# Patient Record
Sex: MALE | Employment: FULL TIME | URBAN - METROPOLITAN AREA
[De-identification: names, ages, dates, MRNs, and addresses within clinical notes are randomized per-mention and may not be internally consistent; named-entity substitution may affect disease eponyms.]

---

## 2017-01-03 ENCOUNTER — MED REC SCAN ONLY (OUTPATIENT)
Dept: FAMILY MEDICINE CLINIC | Facility: CLINIC | Age: 32
End: 2017-01-03

## 2017-01-03 ENCOUNTER — TELEPHONE (OUTPATIENT)
Dept: FAMILY MEDICINE CLINIC | Facility: CLINIC | Age: 32
End: 2017-01-03

## 2017-01-03 NOTE — TELEPHONE ENCOUNTER
Spoke with the pt and advised that I do not know if the pharmacy will fill it on 1/5/17 or if they can fill it and then transfer it down to [place].  I advised him to call the pharmacy to see what they recommend- he v/u

## 2017-02-02 ENCOUNTER — TELEPHONE (OUTPATIENT)
Dept: FAMILY MEDICINE CLINIC | Facility: CLINIC | Age: 32
End: 2017-02-02

## 2017-02-02 RX ORDER — MOMETASONE FUROATE 1 MG/G
CREAM TOPICAL
Qty: 60 G | Refills: 1 | Status: SHIPPED | OUTPATIENT
Start: 2017-02-02 | End: 2017-06-16

## 2017-02-02 NOTE — TELEPHONE ENCOUNTER
From: Ember Mckee  To: Nedra Genao MD  Sent: 2/2/2017 11:51 AM CST  Subject: Medication Renewal Request    Original authorizing provider: MD Ember Solorzano would like a refill of the following medications:  Amphetamine-Dextroamphet

## 2017-02-02 NOTE — TELEPHONE ENCOUNTER
From: Ebonie Downs  To:  Ilir Monzon DO  Sent: 2/2/2017 11:51 AM CST  Subject: Medication Renewal Request    Original authorizing provider: DO Ebonie Rankin would like a refill of the following medications:  Mometasone Furoate [facility]

## 2017-02-03 RX ORDER — DEXTROAMPHETAMINE SACCHARATE, AMPHETAMINE ASPARTATE MONOHYDRATE, DEXTROAMPHETAMINE SULFATE AND AMPHETAMINE SULFATE 7.5; 7.5; 7.5; 7.5 MG/1; MG/1; MG/1; MG/1
30 CAPSULE, EXTENDED RELEASE ORAL DAILY
Qty: 30 CAPSULE | Refills: 0 | Status: SHIPPED | OUTPATIENT
Start: 2017-02-03 | End: 2017-03-01

## 2017-02-03 RX ORDER — DEXTROAMPHETAMINE SACCHARATE, AMPHETAMINE ASPARTATE, DEXTROAMPHETAMINE SULFATE AND AMPHETAMINE SULFATE 7.5; 7.5; 7.5; 7.5 MG/1; MG/1; MG/1; MG/1
TABLET ORAL
Qty: 30 TABLET | Refills: 0 | Status: SHIPPED | OUTPATIENT
Start: 2017-02-03 | End: 2017-03-01

## 2017-03-01 RX ORDER — DEXTROAMPHETAMINE SACCHARATE, AMPHETAMINE ASPARTATE, DEXTROAMPHETAMINE SULFATE AND AMPHETAMINE SULFATE 7.5; 7.5; 7.5; 7.5 MG/1; MG/1; MG/1; MG/1
TABLET ORAL
Qty: 30 TABLET | Refills: 0 | Status: SHIPPED | OUTPATIENT
Start: 2017-03-01 | End: 2017-03-31

## 2017-03-01 RX ORDER — PANTOPRAZOLE SODIUM 40 MG/1
40 TABLET, DELAYED RELEASE ORAL DAILY
Qty: 30 TABLET | Refills: 1 | Status: SHIPPED | OUTPATIENT
Start: 2017-03-01 | End: 2017-09-11

## 2017-03-01 RX ORDER — DEXTROAMPHETAMINE SACCHARATE, AMPHETAMINE ASPARTATE MONOHYDRATE, DEXTROAMPHETAMINE SULFATE AND AMPHETAMINE SULFATE 7.5; 7.5; 7.5; 7.5 MG/1; MG/1; MG/1; MG/1
30 CAPSULE, EXTENDED RELEASE ORAL DAILY
Qty: 30 CAPSULE | Refills: 0 | Status: SHIPPED | OUTPATIENT
Start: 2017-03-01 | End: 2017-03-31

## 2017-03-01 NOTE — TELEPHONE ENCOUNTER
Last refill on both Adderall #30 with 0 refills on 2 3 2017  Last refill on Protonix #30 with 1 refill on 5 16 2016

## 2017-03-01 NOTE — TELEPHONE ENCOUNTER
From: Yareli Butler  To: Dillon Swanson MD  Sent: 3/1/2017 2:20 PM CST  Subject: Medication Renewal Request    Original authorizing provider: MD Yareli Siu would like a refill of the following medications:  Pantoprazole Sodium (PROT

## 2017-03-31 RX ORDER — DEXTROAMPHETAMINE SACCHARATE, AMPHETAMINE ASPARTATE MONOHYDRATE, DEXTROAMPHETAMINE SULFATE AND AMPHETAMINE SULFATE 7.5; 7.5; 7.5; 7.5 MG/1; MG/1; MG/1; MG/1
30 CAPSULE, EXTENDED RELEASE ORAL DAILY
Qty: 30 CAPSULE | Refills: 0 | Status: SHIPPED | OUTPATIENT
Start: 2017-03-31 | End: 2017-04-26

## 2017-03-31 RX ORDER — DEXTROAMPHETAMINE SACCHARATE, AMPHETAMINE ASPARTATE MONOHYDRATE, DEXTROAMPHETAMINE SULFATE AND AMPHETAMINE SULFATE 7.5; 7.5; 7.5; 7.5 MG/1; MG/1; MG/1; MG/1
30 CAPSULE, EXTENDED RELEASE ORAL EVERY MORNING
Qty: 30 CAPSULE | Refills: 0 | Status: CANCELLED | OUTPATIENT
Start: 2017-03-31

## 2017-03-31 RX ORDER — DEXTROAMPHETAMINE SACCHARATE, AMPHETAMINE ASPARTATE, DEXTROAMPHETAMINE SULFATE AND AMPHETAMINE SULFATE 7.5; 7.5; 7.5; 7.5 MG/1; MG/1; MG/1; MG/1
TABLET ORAL
Qty: 30 TABLET | Refills: 0 | Status: SHIPPED | OUTPATIENT
Start: 2017-03-31 | End: 2017-04-26

## 2017-03-31 NOTE — TELEPHONE ENCOUNTER
From: Joshua Arreaga  To: Chico Avila MD  Sent: 3/31/2017 8:57 AM CDT  Subject: Medication Renewal Request    Original authorizing provider: MD Joshua Lindsey would like a refill of the following medications:  Amphetamine-Dextroamphet

## 2017-04-26 RX ORDER — DEXTROAMPHETAMINE SACCHARATE, AMPHETAMINE ASPARTATE MONOHYDRATE, DEXTROAMPHETAMINE SULFATE AND AMPHETAMINE SULFATE 7.5; 7.5; 7.5; 7.5 MG/1; MG/1; MG/1; MG/1
30 CAPSULE, EXTENDED RELEASE ORAL DAILY
Qty: 30 CAPSULE | Refills: 0 | Status: SHIPPED | OUTPATIENT
Start: 2017-04-26 | End: 2017-05-23

## 2017-04-26 RX ORDER — DEXTROAMPHETAMINE SACCHARATE, AMPHETAMINE ASPARTATE, DEXTROAMPHETAMINE SULFATE AND AMPHETAMINE SULFATE 7.5; 7.5; 7.5; 7.5 MG/1; MG/1; MG/1; MG/1
TABLET ORAL
Qty: 30 TABLET | Refills: 0 | Status: SHIPPED | OUTPATIENT
Start: 2017-04-26 | End: 2017-05-23

## 2017-05-23 RX ORDER — DEXTROAMPHETAMINE SACCHARATE, AMPHETAMINE ASPARTATE MONOHYDRATE, DEXTROAMPHETAMINE SULFATE AND AMPHETAMINE SULFATE 7.5; 7.5; 7.5; 7.5 MG/1; MG/1; MG/1; MG/1
30 CAPSULE, EXTENDED RELEASE ORAL DAILY
Qty: 30 CAPSULE | Refills: 0 | Status: SHIPPED | OUTPATIENT
Start: 2017-05-23 | End: 2017-06-16

## 2017-05-23 RX ORDER — DEXTROAMPHETAMINE SACCHARATE, AMPHETAMINE ASPARTATE, DEXTROAMPHETAMINE SULFATE AND AMPHETAMINE SULFATE 7.5; 7.5; 7.5; 7.5 MG/1; MG/1; MG/1; MG/1
TABLET ORAL
Qty: 30 TABLET | Refills: 0 | Status: SHIPPED | OUTPATIENT
Start: 2017-05-23 | End: 2017-06-16

## 2017-05-23 NOTE — TELEPHONE ENCOUNTER
From: Florinda Perkins  To: Theresa Hall MD  Sent: 5/23/2017 1:56 PM CDT  Subject: Medication Renewal Request    Original authorizing provider: MD Florinda Morfin would like a refill of the following medications:  Amphetamine-Dextroamphet

## 2017-06-16 RX ORDER — DEXTROAMPHETAMINE SACCHARATE, AMPHETAMINE ASPARTATE MONOHYDRATE, DEXTROAMPHETAMINE SULFATE AND AMPHETAMINE SULFATE 7.5; 7.5; 7.5; 7.5 MG/1; MG/1; MG/1; MG/1
30 CAPSULE, EXTENDED RELEASE ORAL DAILY
Qty: 30 CAPSULE | Refills: 0 | Status: SHIPPED | OUTPATIENT
Start: 2017-06-16 | End: 2017-07-16

## 2017-06-16 RX ORDER — MOMETASONE FUROATE 1 MG/G
CREAM TOPICAL
Qty: 60 G | Refills: 1 | Status: SHIPPED | OUTPATIENT
Start: 2017-06-16 | End: 2017-11-06

## 2017-06-16 RX ORDER — DEXTROAMPHETAMINE SACCHARATE, AMPHETAMINE ASPARTATE, DEXTROAMPHETAMINE SULFATE AND AMPHETAMINE SULFATE 7.5; 7.5; 7.5; 7.5 MG/1; MG/1; MG/1; MG/1
TABLET ORAL
Qty: 30 TABLET | Refills: 0 | Status: SHIPPED | OUTPATIENT
Start: 2017-06-16 | End: 2017-07-17

## 2017-06-16 NOTE — TELEPHONE ENCOUNTER
From: Elizabeth Nava  To: Keane Rubinstein, MD  Sent: 6/16/2017 12:26 PM CDT  Subject: Medication Renewal Request    Original authorizing provider: Gussie Holstein, MD Sharyon Corners would like a refill of the following medications:  Mometasone Furoate 0.1

## 2017-07-17 RX ORDER — DEXTROAMPHETAMINE SACCHARATE, AMPHETAMINE ASPARTATE, DEXTROAMPHETAMINE SULFATE AND AMPHETAMINE SULFATE 7.5; 7.5; 7.5; 7.5 MG/1; MG/1; MG/1; MG/1
TABLET ORAL
Qty: 30 TABLET | Refills: 0 | Status: SHIPPED | OUTPATIENT
Start: 2017-07-17 | End: 2017-08-11

## 2017-07-17 RX ORDER — DEXTROAMPHETAMINE SACCHARATE, AMPHETAMINE ASPARTATE MONOHYDRATE, DEXTROAMPHETAMINE SULFATE AND AMPHETAMINE SULFATE 7.5; 7.5; 7.5; 7.5 MG/1; MG/1; MG/1; MG/1
30 CAPSULE, EXTENDED RELEASE ORAL EVERY MORNING
Qty: 30 CAPSULE | Refills: 0 | Status: SHIPPED | OUTPATIENT
Start: 2017-07-17 | End: 2017-08-11

## 2017-07-17 NOTE — TELEPHONE ENCOUNTER
Patient picked up 2 scripts for Adderall ( 1 for Adderall XR 30 MG & 1 for Adderall 30 MG) on 07/17/17. [place] ID #[id number].

## 2017-07-17 NOTE — TELEPHONE ENCOUNTER
From: Martha Castillo  Sent: 7/17/2017 9:37 AM CDT  Subject: Medication Renewal Request    Martha Castillo would like a refill of the following medications:  Amphetamine-Dextroamphet ER (ADDERALL XR) 30 MG Oral Capsule SR 24 Hr Mayela Lopez MD]    Prefe

## 2017-08-11 NOTE — TELEPHONE ENCOUNTER
From: Concepcion Mathew  Sent: 8/11/2017 12:20 PM CDT  Subject: Medication Renewal Request    Concepcion Mathew would like a refill of the following medications:  Fluticasone Propionate 50 MCG/ACT Nasal Suspension Crystal Soto MD]  Amphetamine-Dextroamphet

## 2017-08-14 RX ORDER — DEXTROAMPHETAMINE SACCHARATE, AMPHETAMINE ASPARTATE, DEXTROAMPHETAMINE SULFATE AND AMPHETAMINE SULFATE 7.5; 7.5; 7.5; 7.5 MG/1; MG/1; MG/1; MG/1
TABLET ORAL
Qty: 30 TABLET | Refills: 0 | Status: SHIPPED | OUTPATIENT
Start: 2017-08-14 | End: 2017-09-12

## 2017-08-14 RX ORDER — DEXTROAMPHETAMINE SACCHARATE, AMPHETAMINE ASPARTATE MONOHYDRATE, DEXTROAMPHETAMINE SULFATE AND AMPHETAMINE SULFATE 7.5; 7.5; 7.5; 7.5 MG/1; MG/1; MG/1; MG/1
30 CAPSULE, EXTENDED RELEASE ORAL EVERY MORNING
Qty: 30 CAPSULE | Refills: 0 | Status: SHIPPED | OUTPATIENT
Start: 2017-08-14 | End: 2017-09-11

## 2017-08-14 RX ORDER — FLUTICASONE PROPIONATE 50 MCG
2 SPRAY, SUSPENSION (ML) NASAL DAILY
Qty: 1 BOTTLE | Refills: 6 | Status: SHIPPED | OUTPATIENT
Start: 2017-08-14 | End: 2018-01-20 | Stop reason: ALTCHOICE

## 2017-08-14 RX ORDER — LEVOCETIRIZINE DIHYDROCHLORIDE 5 MG/1
5 TABLET, FILM COATED ORAL EVERY EVENING
Qty: 30 TABLET | Refills: 6 | Status: SHIPPED | OUTPATIENT
Start: 2017-08-14 | End: 2018-01-20 | Stop reason: ALTCHOICE

## 2017-09-12 RX ORDER — DEXTROAMPHETAMINE SACCHARATE, AMPHETAMINE ASPARTATE, DEXTROAMPHETAMINE SULFATE AND AMPHETAMINE SULFATE 7.5; 7.5; 7.5; 7.5 MG/1; MG/1; MG/1; MG/1
TABLET ORAL
Qty: 30 TABLET | Refills: 0 | Status: SHIPPED | OUTPATIENT
Start: 2017-09-12 | End: 2017-10-09

## 2017-09-12 RX ORDER — DEXTROAMPHETAMINE SACCHARATE, AMPHETAMINE ASPARTATE MONOHYDRATE, DEXTROAMPHETAMINE SULFATE AND AMPHETAMINE SULFATE 7.5; 7.5; 7.5; 7.5 MG/1; MG/1; MG/1; MG/1
30 CAPSULE, EXTENDED RELEASE ORAL EVERY MORNING
Qty: 30 CAPSULE | Refills: 0 | Status: SHIPPED | OUTPATIENT
Start: 2017-09-12 | End: 2017-10-09

## 2017-09-12 RX ORDER — PANTOPRAZOLE SODIUM 40 MG/1
40 TABLET, DELAYED RELEASE ORAL DAILY
Qty: 30 TABLET | Refills: 2 | Status: SHIPPED | OUTPATIENT
Start: 2017-09-12 | End: 2017-11-29

## 2017-09-12 NOTE — TELEPHONE ENCOUNTER
From: Sourav Arauz  Sent: 9/11/2017 11:26 AM CDT  Subject: Medication Renewal Request    Sourav Arauz would like a refill of the following medications:  Pantoprazole Sodium (PROTONIX) 40 MG Oral Tab EC Selena Navarro MD]  Amphetamine-Dextroamphet ER (ADDERALL XR) 30 MG Oral Capsule SR 24 Hr Selena Navarro MD]    Preferred pharmacy: [address] [place] [address], [address] [place] [address],[address] AT [facility] [address], [phone], [phone]    Comment:  Hello, I wanted to see if I could get both prescriptions of aderrall filled, I only see the 30 mg caps on the list. Thanks Dave Briones

## 2017-09-12 NOTE — TELEPHONE ENCOUNTER
Last OV 11/7/16  Last refilled:  8/14/17  Both Adderall (30 mg XR and 30 mg regular)  #30  0 refills  3/1/17  Protonix #30  1 refill

## 2017-10-09 NOTE — TELEPHONE ENCOUNTER
From: Alberto Arguello  Sent: 10/9/2017 9:35 AM CDT  Subject: Medication Renewal Request    Alberto Arguello would like a refill of the following medications:     Amphetamine-Dextroamphet ER (ADDERALL XR) 30 MG Oral Capsule SR 24 Hr Brendan Grant MD]   Mono Major

## 2017-10-10 RX ORDER — DEXTROAMPHETAMINE SACCHARATE, AMPHETAMINE ASPARTATE, DEXTROAMPHETAMINE SULFATE AND AMPHETAMINE SULFATE 7.5; 7.5; 7.5; 7.5 MG/1; MG/1; MG/1; MG/1
TABLET ORAL
Qty: 30 TABLET | Refills: 0 | Status: SHIPPED | OUTPATIENT
Start: 2017-10-10 | End: 2017-11-06

## 2017-10-10 RX ORDER — DEXTROAMPHETAMINE SACCHARATE, AMPHETAMINE ASPARTATE MONOHYDRATE, DEXTROAMPHETAMINE SULFATE AND AMPHETAMINE SULFATE 7.5; 7.5; 7.5; 7.5 MG/1; MG/1; MG/1; MG/1
30 CAPSULE, EXTENDED RELEASE ORAL EVERY MORNING
Qty: 30 CAPSULE | Refills: 0 | Status: SHIPPED | OUTPATIENT
Start: 2017-10-10 | End: 2017-11-06

## 2017-10-10 NOTE — TELEPHONE ENCOUNTER
Spoke with the pt and advised that his scripts are ready and that he needs an appt  He v/u and he does have an appt scheduled  Future Appointments  Date Time Provider Kwame Guerra   11/29/2017 9:20 AM Raleigh Mayen MD [facility] MASHA Howell

## 2017-11-06 RX ORDER — MOMETASONE FUROATE 1 MG/G
CREAM TOPICAL
Qty: 60 G | Refills: 1 | Status: SHIPPED
Start: 2017-11-06 | End: 2018-01-20 | Stop reason: ALTCHOICE

## 2017-11-06 RX ORDER — DEXTROAMPHETAMINE SACCHARATE, AMPHETAMINE ASPARTATE MONOHYDRATE, DEXTROAMPHETAMINE SULFATE AND AMPHETAMINE SULFATE 7.5; 7.5; 7.5; 7.5 MG/1; MG/1; MG/1; MG/1
30 CAPSULE, EXTENDED RELEASE ORAL EVERY MORNING
Qty: 30 CAPSULE | Refills: 0 | Status: SHIPPED | OUTPATIENT
Start: 2017-11-06 | End: 2017-11-29

## 2017-11-06 RX ORDER — DEXTROAMPHETAMINE SACCHARATE, AMPHETAMINE ASPARTATE, DEXTROAMPHETAMINE SULFATE AND AMPHETAMINE SULFATE 7.5; 7.5; 7.5; 7.5 MG/1; MG/1; MG/1; MG/1
TABLET ORAL
Qty: 30 TABLET | Refills: 0 | Status: SHIPPED | OUTPATIENT
Start: 2017-11-06 | End: 2017-11-29

## 2017-11-06 NOTE — TELEPHONE ENCOUNTER
From: Yaw Salvador  Sent: 11/6/2017 10:02 AM CST  Subject: Medication Renewal Request    Yaw Salvador would like a refill of the following medications:     Mometasone Furoate 0.1 % External Cream Keira Josue MD]     Amphetamine-Dextroamphet ER (A

## 2017-11-06 NOTE — TELEPHONE ENCOUNTER
Last OV 11-7-16  Last refilled:   Both adderall scripts refilled 10/10/17  #30  0 refills  Mometasone refilled 6/16/17  60g  #1 refill

## 2017-11-29 ENCOUNTER — OFFICE VISIT (OUTPATIENT)
Dept: FAMILY MEDICINE CLINIC | Facility: CLINIC | Age: 32
End: 2017-11-29

## 2017-11-29 VITALS
SYSTOLIC BLOOD PRESSURE: 140 MMHG | TEMPERATURE: 99 F | BODY MASS INDEX: 24.17 KG/M2 | DIASTOLIC BLOOD PRESSURE: 80 MMHG | HEART RATE: 88 BPM | RESPIRATION RATE: 14 BRPM | WEIGHT: 154 LBS | HEIGHT: 67 IN

## 2017-11-29 DIAGNOSIS — M54.42 CHRONIC LEFT-SIDED LOW BACK PAIN WITH LEFT-SIDED SCIATICA: ICD-10-CM

## 2017-11-29 DIAGNOSIS — Z13.21 ENCOUNTER FOR VITAMIN DEFICIENCY SCREENING: ICD-10-CM

## 2017-11-29 DIAGNOSIS — Z13.220 LIPID SCREENING: ICD-10-CM

## 2017-11-29 DIAGNOSIS — Z00.00 ROUTINE HISTORY AND PHYSICAL EXAMINATION OF ADULT: Primary | ICD-10-CM

## 2017-11-29 DIAGNOSIS — L30.8 OTHER ECZEMA: ICD-10-CM

## 2017-11-29 DIAGNOSIS — Z13.0 SCREENING FOR DEFICIENCY ANEMIA: ICD-10-CM

## 2017-11-29 DIAGNOSIS — J30.2 CHRONIC SEASONAL ALLERGIC RHINITIS DUE TO OTHER ALLERGEN: ICD-10-CM

## 2017-11-29 DIAGNOSIS — F90.2 ATTENTION DEFICIT HYPERACTIVITY DISORDER (ADHD), COMBINED TYPE: ICD-10-CM

## 2017-11-29 DIAGNOSIS — Z13.29 THYROID DISORDER SCREEN: ICD-10-CM

## 2017-11-29 DIAGNOSIS — G89.29 CHRONIC LEFT-SIDED LOW BACK PAIN WITH LEFT-SIDED SCIATICA: ICD-10-CM

## 2017-11-29 DIAGNOSIS — R35.0 URINARY FREQUENCY: ICD-10-CM

## 2017-11-29 DIAGNOSIS — Z13.1 DIABETES MELLITUS SCREENING: ICD-10-CM

## 2017-11-29 DIAGNOSIS — Z23 NEED FOR VACCINATION: ICD-10-CM

## 2017-11-29 PROCEDURE — 83036 HEMOGLOBIN GLYCOSYLATED A1C: CPT | Performed by: FAMILY MEDICINE

## 2017-11-29 PROCEDURE — 90686 IIV4 VACC NO PRSV 0.5 ML IM: CPT | Performed by: FAMILY MEDICINE

## 2017-11-29 PROCEDURE — 90471 IMMUNIZATION ADMIN: CPT | Performed by: FAMILY MEDICINE

## 2017-11-29 PROCEDURE — 81003 URINALYSIS AUTO W/O SCOPE: CPT | Performed by: FAMILY MEDICINE

## 2017-11-29 PROCEDURE — 84153 ASSAY OF PSA TOTAL: CPT | Performed by: FAMILY MEDICINE

## 2017-11-29 PROCEDURE — 99395 PREV VISIT EST AGE 18-39: CPT | Performed by: FAMILY MEDICINE

## 2017-11-29 PROCEDURE — 36415 COLL VENOUS BLD VENIPUNCTURE: CPT | Performed by: FAMILY MEDICINE

## 2017-11-29 PROCEDURE — 80061 LIPID PANEL: CPT | Performed by: FAMILY MEDICINE

## 2017-11-29 PROCEDURE — 82306 VITAMIN D 25 HYDROXY: CPT | Performed by: FAMILY MEDICINE

## 2017-11-29 PROCEDURE — 80050 GENERAL HEALTH PANEL: CPT | Performed by: FAMILY MEDICINE

## 2017-11-29 RX ORDER — DEXTROAMPHETAMINE SACCHARATE, AMPHETAMINE ASPARTATE MONOHYDRATE, DEXTROAMPHETAMINE SULFATE AND AMPHETAMINE SULFATE 7.5; 7.5; 7.5; 7.5 MG/1; MG/1; MG/1; MG/1
30 CAPSULE, EXTENDED RELEASE ORAL EVERY MORNING
Qty: 30 CAPSULE | Refills: 0 | Status: SHIPPED | OUTPATIENT
Start: 2017-11-29 | End: 2017-12-04

## 2017-11-29 RX ORDER — DEXTROAMPHETAMINE SACCHARATE, AMPHETAMINE ASPARTATE, DEXTROAMPHETAMINE SULFATE AND AMPHETAMINE SULFATE 7.5; 7.5; 7.5; 7.5 MG/1; MG/1; MG/1; MG/1
TABLET ORAL
Qty: 30 TABLET | Refills: 0 | Status: SHIPPED | OUTPATIENT
Start: 2017-11-29 | End: 2017-12-27

## 2017-11-29 NOTE — PROGRESS NOTES
Jason Mazariegos is a 33year old male who presents for a complete physical exam.   HPI:   Pt complains of a few minor things:    1) As long as he can remember he \"pees excessively:-12-16x/day.  No recent change and not severely bothersome, but his wife norma teenager and he thinks was put on ritalin at the time but he was anti medicine at that time) and it does still help significantly, less so than it probably used to.   He is a little more scattered in his organizing and doing too many things at once to not c 11/29/2017   ALT 34 11/07/2016   ALT 18 10/25/2013     No results found for: PSA       Current Outpatient Prescriptions:  Amphetamine-Dextroamphet ER (ADDERALL XR) 30 MG Oral Capsule SR 24 Hr Take 1 capsule (30 mg total) by mouth every morning.  Disp: 30 ca denies headaches  PSYCHE: denies depression or anxiety; see HPI  HEMATOLOGIC: denies hx of unusual bruising or bleeding    EXAM:   /80   Pulse 88   Temp 98.6 °F (37 °C) (Temporal)   Resp 14   Ht 67\"   Wt 154 lb   BMI 24.12 kg/m²   Body mass index is rhinitis due to other allergen  -well controlled, CPM  Urinary frequency  -if labs and u/a neg can f/u with uro for further eval  Need for vaccination  Lipid screening  Screening for deficiency anemia  Diabetes mellitus screening  Thyroid disorder screen

## 2017-12-04 ENCOUNTER — TELEPHONE (OUTPATIENT)
Dept: FAMILY MEDICINE CLINIC | Facility: CLINIC | Age: 32
End: 2017-12-04

## 2017-12-04 RX ORDER — DEXTROAMPHETAMINE SACCHARATE, AMPHETAMINE ASPARTATE MONOHYDRATE, DEXTROAMPHETAMINE SULFATE AND AMPHETAMINE SULFATE 7.5; 7.5; 7.5; 7.5 MG/1; MG/1; MG/1; MG/1
30 CAPSULE, EXTENDED RELEASE ORAL EVERY MORNING
Qty: 30 CAPSULE | Refills: 0 | Status: SHIPPED | OUTPATIENT
Start: 2017-12-04 | End: 2017-12-27

## 2017-12-04 NOTE — TELEPHONE ENCOUNTER
Spoke with the pt and he either washed it or threw it away- he is having his wife look through his pants- he is missong the adderall xr    I explained to him that we normally would not reprint this for him since this is a controlled substance but we have n

## 2017-12-04 NOTE — TELEPHONE ENCOUNTER
Spoke with the pt and advised that the script has been printed and that I will have it ready in about an hour    He v/u  He will be the one to pick this up

## 2017-12-27 ENCOUNTER — TELEPHONE (OUTPATIENT)
Dept: FAMILY MEDICINE CLINIC | Facility: CLINIC | Age: 32
End: 2017-12-27

## 2017-12-27 RX ORDER — DEXTROAMPHETAMINE SACCHARATE, AMPHETAMINE ASPARTATE, DEXTROAMPHETAMINE SULFATE AND AMPHETAMINE SULFATE 7.5; 7.5; 7.5; 7.5 MG/1; MG/1; MG/1; MG/1
TABLET ORAL
Qty: 30 TABLET | Refills: 0 | Status: SHIPPED | OUTPATIENT
Start: 2017-12-27 | End: 2018-01-26

## 2017-12-27 RX ORDER — DEXTROAMPHETAMINE SACCHARATE, AMPHETAMINE ASPARTATE MONOHYDRATE, DEXTROAMPHETAMINE SULFATE AND AMPHETAMINE SULFATE 7.5; 7.5; 7.5; 7.5 MG/1; MG/1; MG/1; MG/1
30 CAPSULE, EXTENDED RELEASE ORAL EVERY MORNING
Qty: 30 CAPSULE | Refills: 0 | Status: SHIPPED | OUTPATIENT
Start: 2017-12-27 | End: 2018-01-26

## 2017-12-27 NOTE — TELEPHONE ENCOUNTER
Pt stopped in and picked up script for ADDERALL caps. Pt states he needs a refill on ADDERALL 30mg tablets. Pt waiting in waiting in waiting room.

## 2017-12-27 NOTE — TELEPHONE ENCOUNTER
From: Devora Green  Sent: 12/27/2017 9:16 AM CST  Subject: Medication Renewal Request    Devora Green would like a refill of the following medications:     Amphetamine-Dextroamphet ER (ADDERALL XR) 30 MG Oral Capsule SR 24 Hr Alejandra Mata MD]   Pa

## 2018-01-20 ENCOUNTER — OFFICE VISIT (OUTPATIENT)
Dept: FAMILY MEDICINE CLINIC | Facility: CLINIC | Age: 33
End: 2018-01-20

## 2018-01-20 VITALS
TEMPERATURE: 99 F | HEART RATE: 100 BPM | RESPIRATION RATE: 16 BRPM | SYSTOLIC BLOOD PRESSURE: 126 MMHG | WEIGHT: 154 LBS | BODY MASS INDEX: 24.17 KG/M2 | DIASTOLIC BLOOD PRESSURE: 80 MMHG | HEIGHT: 67 IN

## 2018-01-20 DIAGNOSIS — J02.9 PHARYNGITIS, UNSPECIFIED ETIOLOGY: Primary | ICD-10-CM

## 2018-01-20 DIAGNOSIS — J01.30 ACUTE NON-RECURRENT SPHENOIDAL SINUSITIS: ICD-10-CM

## 2018-01-20 DIAGNOSIS — R50.9 FEVER, UNSPECIFIED FEVER CAUSE: ICD-10-CM

## 2018-01-20 PROCEDURE — 99214 OFFICE O/P EST MOD 30 MIN: CPT | Performed by: FAMILY MEDICINE

## 2018-01-20 RX ORDER — AMOXICILLIN AND CLAVULANATE POTASSIUM 875; 125 MG/1; MG/1
1 TABLET, FILM COATED ORAL 2 TIMES DAILY
Qty: 20 TABLET | Refills: 0 | Status: SHIPPED | OUTPATIENT
Start: 2018-01-20 | End: 2018-01-30

## 2018-01-20 NOTE — PROGRESS NOTES
HPI:   Mal Moya is a 33year old male who presents for upper respiratory symptoms for  2  days. Patient reports congestion, fever with Tmax to 102, cough with yellow and brown colored sputum, cough is keeping pt up at night.       Current Outpatient normocephalic,ears shows scarring from previous infection  NECK: supple,no adenopathy,no bruits  LUNGS: clear to auscultation  CARDIO: RRR without murmur  GI: good BS's,no masses, HSM or tenderness    ASSESSMENT AND PLAN:   Pharyngitis, unspecified etiolog

## 2018-01-26 RX ORDER — DEXTROAMPHETAMINE SACCHARATE, AMPHETAMINE ASPARTATE MONOHYDRATE, DEXTROAMPHETAMINE SULFATE AND AMPHETAMINE SULFATE 7.5; 7.5; 7.5; 7.5 MG/1; MG/1; MG/1; MG/1
30 CAPSULE, EXTENDED RELEASE ORAL EVERY MORNING
Qty: 30 CAPSULE | Refills: 0 | Status: SHIPPED | OUTPATIENT
Start: 2018-01-26 | End: 2018-02-21

## 2018-01-26 RX ORDER — DEXTROAMPHETAMINE SACCHARATE, AMPHETAMINE ASPARTATE MONOHYDRATE, DEXTROAMPHETAMINE SULFATE AND AMPHETAMINE SULFATE 7.5; 7.5; 7.5; 7.5 MG/1; MG/1; MG/1; MG/1
30 CAPSULE, EXTENDED RELEASE ORAL EVERY MORNING
Qty: 30 CAPSULE | Refills: 0 | Status: CANCELLED
Start: 2018-01-26

## 2018-01-26 RX ORDER — DEXTROAMPHETAMINE SACCHARATE, AMPHETAMINE ASPARTATE, DEXTROAMPHETAMINE SULFATE AND AMPHETAMINE SULFATE 7.5; 7.5; 7.5; 7.5 MG/1; MG/1; MG/1; MG/1
TABLET ORAL
Qty: 30 TABLET | Refills: 0 | Status: SHIPPED | OUTPATIENT
Start: 2018-01-26 | End: 2018-02-21

## 2018-01-26 NOTE — TELEPHONE ENCOUNTER
From: Alberto Arguello  Sent: 1/26/2018 8:37 AM CST  Subject: Medication Renewal Request    Alberto Arguello would like a refill of the following medications:     Amphetamine-Dextroamphet ER (ADDERALL XR) 30 MG Oral Capsule SR 24 Hr Brendan Grant MD]   Mono Major

## 2018-01-29 ENCOUNTER — PATIENT MESSAGE (OUTPATIENT)
Dept: FAMILY MEDICINE CLINIC | Facility: CLINIC | Age: 33
End: 2018-01-29

## 2018-01-29 ENCOUNTER — TELEPHONE (OUTPATIENT)
Dept: FAMILY MEDICINE CLINIC | Facility: CLINIC | Age: 33
End: 2018-01-29

## 2018-01-29 RX ORDER — CEFPROZIL 250 MG/1
250 TABLET, FILM COATED ORAL 2 TIMES DAILY
Qty: 20 TABLET | Refills: 0 | Status: SHIPPED | OUTPATIENT
Start: 2018-01-29 | End: 2018-05-11 | Stop reason: ALTCHOICE

## 2018-01-29 NOTE — TELEPHONE ENCOUNTER
Pt says he was talk with DS over MyChart and was unable to respond.  Pt would like to use [facility] on [address]

## 2018-01-29 NOTE — TELEPHONE ENCOUNTER
From: Lauern Santiago  To: Janna Preston DO  Sent: 1/29/2018 11:34 AM CST  Subject: Prescription Question    Sorry it wouldn’t let me respond to the message you sent me back. Pharmacy is [facility] on [address] in [place].     Thanks   José

## 2018-01-29 NOTE — TELEPHONE ENCOUNTER
From: Rishi Velasquez  To:  Channing Tapia DO  Sent: 1/29/2018 7:48 AM CST  Subject: Prescription Question    Hello,    I was prescribed an antibiotic last Saturday when I came in, I stopped taking it because I would throw up every time that I took it, and

## 2018-01-31 ENCOUNTER — HOSPITAL ENCOUNTER (OUTPATIENT)
Age: 33
Discharge: HOME | End: 2018-01-31
Attending: FAMILY MEDICINE
Payer: COMMERCIAL

## 2018-01-31 VITALS
DIASTOLIC BLOOD PRESSURE: 97 MMHG | SYSTOLIC BLOOD PRESSURE: 128 MMHG | OXYGEN SATURATION: 98 % | WEIGHT: 145 LBS | RESPIRATION RATE: 16 BRPM | TEMPERATURE: 98 F | HEART RATE: 106 BPM | BODY MASS INDEX: 23 KG/M2

## 2018-01-31 DIAGNOSIS — T15.01XA CORNEAL FOREIGN BODY, RIGHT, INITIAL ENCOUNTER: Primary | ICD-10-CM

## 2018-01-31 PROCEDURE — 65220 REMOVE FOREIGN BODY FROM EYE: CPT

## 2018-01-31 PROCEDURE — 99213 OFFICE O/P EST LOW 20 MIN: CPT

## 2018-01-31 PROCEDURE — 99204 OFFICE O/P NEW MOD 45 MIN: CPT

## 2018-01-31 RX ORDER — CIPROFLOXACIN HYDROCHLORIDE 3.5 MG/ML
2 SOLUTION/ DROPS TOPICAL 4 TIMES DAILY
Qty: 1 BOTTLE | Refills: 0 | Status: SHIPPED | OUTPATIENT
Start: 2018-01-31 | End: 2018-02-05

## 2018-01-31 NOTE — ED PROVIDER NOTES
Patient presents with:  Eye Pain    HPI:     Yaw Salvador is a 33year old male who presents today with a chief complaint of pain and redness in  right eye. Type of injury: Possible metallic foreign body in the right eye.   Patient states last night he EOMI+: Yes  - Cornea :  a corneal foreign body noted at 9 o'clock position close to the limbus.   - Conjunctiva: palpebral and bulbar conjunctival injection.   - Perilimbal injection: Yes  - Eyelids normal : Yes  - FB : Yes -  a corneal foreign body noted a discussed with patient. See AVS for detailed discharge instructions for your condition today.     Follow Up with:  Seamus Sun MD  [address] [place] [address],[address]  [facility] [phone]    In 2 days  For re-check    Dana Christine [place]  [id number]  [phone]

## 2018-01-31 NOTE — ED INITIAL ASSESSMENT (HPI)
Pt states last night while cutting a steel pipe got a piece of steel in his right eye. States pain and sensitivity today. Pt was wearing a shield and goggles. Tried to flush with saline solution and in the shower.

## 2018-02-21 RX ORDER — PANTOPRAZOLE SODIUM 40 MG/1
40 TABLET, DELAYED RELEASE ORAL DAILY
Qty: 30 TABLET | Refills: 1 | Status: SHIPPED | OUTPATIENT
Start: 2018-02-21 | End: 2018-05-11 | Stop reason: ALTCHOICE

## 2018-02-21 RX ORDER — DEXTROAMPHETAMINE SACCHARATE, AMPHETAMINE ASPARTATE MONOHYDRATE, DEXTROAMPHETAMINE SULFATE AND AMPHETAMINE SULFATE 7.5; 7.5; 7.5; 7.5 MG/1; MG/1; MG/1; MG/1
30 CAPSULE, EXTENDED RELEASE ORAL EVERY MORNING
Qty: 30 CAPSULE | Refills: 0 | Status: SHIPPED | OUTPATIENT
Start: 2018-02-21 | End: 2018-03-15

## 2018-02-21 RX ORDER — DEXTROAMPHETAMINE SACCHARATE, AMPHETAMINE ASPARTATE, DEXTROAMPHETAMINE SULFATE AND AMPHETAMINE SULFATE 7.5; 7.5; 7.5; 7.5 MG/1; MG/1; MG/1; MG/1
TABLET ORAL
Qty: 30 TABLET | Refills: 0 | Status: SHIPPED | OUTPATIENT
Start: 2018-02-21 | End: 2018-03-15

## 2018-02-21 NOTE — TELEPHONE ENCOUNTER
From: Adam Feliciano  Sent: 2/21/2018 12:15 PM CST  Subject: Medication Renewal Request    Adam Feliciano would like a refill of the following medications:     Amphetamine-Dextroamphet ER (ADDERALL XR) 30 MG Oral Capsule SR 24 Hr Ada Sheehan MD]   P

## 2018-03-15 RX ORDER — DEXTROAMPHETAMINE SACCHARATE, AMPHETAMINE ASPARTATE MONOHYDRATE, DEXTROAMPHETAMINE SULFATE AND AMPHETAMINE SULFATE 7.5; 7.5; 7.5; 7.5 MG/1; MG/1; MG/1; MG/1
30 CAPSULE, EXTENDED RELEASE ORAL EVERY MORNING
Qty: 30 CAPSULE | Refills: 0 | Status: SHIPPED | OUTPATIENT
Start: 2018-03-15 | End: 2018-04-11

## 2018-03-15 RX ORDER — DEXTROAMPHETAMINE SACCHARATE, AMPHETAMINE ASPARTATE, DEXTROAMPHETAMINE SULFATE AND AMPHETAMINE SULFATE 7.5; 7.5; 7.5; 7.5 MG/1; MG/1; MG/1; MG/1
TABLET ORAL
Qty: 30 TABLET | Refills: 0 | Status: SHIPPED | OUTPATIENT
Start: 2018-03-15 | End: 2018-04-11

## 2018-03-15 NOTE — TELEPHONE ENCOUNTER
Scripts printed.  Please notify pt I need to see him every 6 months now going forward (this is true for anyone on chronic ADHD meds like him) so he can plan ahead for that (last seen end of nov) thanks

## 2018-03-15 NOTE — TELEPHONE ENCOUNTER
From: Mal Moya  Sent: 3/15/2018 10:18 AM CDT  Subject: Medication Renewal Request    Mal Moya would like a refill of the following medications:     Amphetamine-Dextroamphet ER (ADDERALL XR) 30 MG Oral Capsule SR 24 Hr Nadira Arce MD]

## 2018-03-15 NOTE — TELEPHONE ENCOUNTER
Voicemail left for patient. Advised scripts are ready to be picked up. Advised to bring photo ID when patient comes to  scripts. Advised that Dr Roman Blanca needs to see patient every 6 months. Advised to call office with any further questions.

## 2018-03-15 NOTE — TELEPHONE ENCOUNTER
Adderall ER last refilled on 2/21/18 for # 30 with 0 refills  Adderall last refilled 2/21/18 for #30 with 0 refills    Last seen on 11/29/17. No future appointments. Thank you.

## 2018-04-11 RX ORDER — DEXTROAMPHETAMINE SACCHARATE, AMPHETAMINE ASPARTATE MONOHYDRATE, DEXTROAMPHETAMINE SULFATE AND AMPHETAMINE SULFATE 7.5; 7.5; 7.5; 7.5 MG/1; MG/1; MG/1; MG/1
30 CAPSULE, EXTENDED RELEASE ORAL EVERY MORNING
Qty: 30 CAPSULE | Refills: 0 | Status: SHIPPED | OUTPATIENT
Start: 2018-04-11 | End: 2018-05-11

## 2018-04-11 RX ORDER — DEXTROAMPHETAMINE SACCHARATE, AMPHETAMINE ASPARTATE, DEXTROAMPHETAMINE SULFATE AND AMPHETAMINE SULFATE 7.5; 7.5; 7.5; 7.5 MG/1; MG/1; MG/1; MG/1
TABLET ORAL
Qty: 30 TABLET | Refills: 0 | Status: SHIPPED | OUTPATIENT
Start: 2018-04-11 | End: 2018-05-05

## 2018-04-11 NOTE — TELEPHONE ENCOUNTER
Patient picked up script for Adderall XR 30 MG and Adderall 30 mg on 04/11/18, [place] ID # [id number]

## 2018-04-11 NOTE — TELEPHONE ENCOUNTER
From: Law Tracy  Sent: 4/11/2018 9:06 AM CDT  Subject: Medication Renewal Request    Law Tracy would like a refill of the following medications:     Amphetamine-Dextroamphet ER (ADDERALL XR) 30 MG Oral Capsule SR 24 Hr Jaden Flores MD]   Master Koroma

## 2018-05-11 NOTE — PROGRESS NOTES
Zoë Purcell is a 33year old male. HPI:   Pt is here to f/u on his ADHD.  He scheduled a brief med f/u appointment but had several by the ways as well (see below):    Med compliance: takes it daily, rarely misses    Side effects: nothing bothersome Tab 30mg po q afternoon prn Disp: 30 tablet Rfl: 0        HISTORY:  Past Medical History:   Diagnosis Date   • ADHD (attention deficit hyperactivity disorder)    • Allergic rhinitis    • Eczema    • Unspecified otitis media 12/21/11      History reviewed. (switch to nexium); may need GI f/u if we can't get symptoms under control  Seasonal allergic rhinitis due to other allergic trigger  Likely contributing to  Otalgia, unspecified laterality  -ear pain has been coming more persistently; referred back to his

## 2018-05-11 NOTE — PATIENT INSTRUCTIONS
Suad Toney., Matteo Newton M.D., GAYATRI Ozuna Husbands ANSELMO  [place], [facility]     Tel [phone]  Fax [phone]

## 2018-06-05 RX ORDER — DEXTROAMPHETAMINE SACCHARATE, AMPHETAMINE ASPARTATE, DEXTROAMPHETAMINE SULFATE AND AMPHETAMINE SULFATE 7.5; 7.5; 7.5; 7.5 MG/1; MG/1; MG/1; MG/1
TABLET ORAL
Qty: 30 TABLET | Refills: 0 | Status: SHIPPED | OUTPATIENT
Start: 2018-06-05 | End: 2018-07-02

## 2018-06-05 RX ORDER — DEXTROAMPHETAMINE SACCHARATE, AMPHETAMINE ASPARTATE MONOHYDRATE, DEXTROAMPHETAMINE SULFATE AND AMPHETAMINE SULFATE 7.5; 7.5; 7.5; 7.5 MG/1; MG/1; MG/1; MG/1
30 CAPSULE, EXTENDED RELEASE ORAL EVERY MORNING
Qty: 30 CAPSULE | Refills: 0 | Status: SHIPPED | OUTPATIENT
Start: 2018-06-05 | End: 2018-07-02

## 2018-06-05 NOTE — TELEPHONE ENCOUNTER
Amphetamine-Dextroamphet ER (ADDERALL XR) 30 MG Oral Capsule SR 24 Hr  Last refill: 5/11/2018 #30 with 0 refills    amphetamine-dextroamphetamine 30 MG Oral Tab  Last refill: 5/11/2018 #30 with 0 refills    Last Visit: 5/11/2018    Next Visit: No future ap

## 2018-06-05 NOTE — TELEPHONE ENCOUNTER
From: Francesca Mcginnis  Sent: 6/5/2018 9:42 AM CDT  Subject: Medication Renewal Request    Francesca Mcginnis would like a refill of the following medications:     Amphetamine-Dextroamphet ER (ADDERALL XR) 30 MG Oral Capsule SR 24 Hr Doc Ng MD]     am

## 2018-06-06 NOTE — TELEPHONE ENCOUNTER
Pt picked up script for ADDERALL XR 30 mg06/05/18 & amphetamine-dextroamphetamine 30 mg. Pt [place] ID# [id number].

## 2018-06-13 ENCOUNTER — MED REC SCAN ONLY (OUTPATIENT)
Dept: FAMILY MEDICINE CLINIC | Facility: CLINIC | Age: 33
End: 2018-06-13

## 2018-07-02 RX ORDER — DEXTROAMPHETAMINE SACCHARATE, AMPHETAMINE ASPARTATE MONOHYDRATE, DEXTROAMPHETAMINE SULFATE AND AMPHETAMINE SULFATE 7.5; 7.5; 7.5; 7.5 MG/1; MG/1; MG/1; MG/1
30 CAPSULE, EXTENDED RELEASE ORAL EVERY MORNING
Qty: 30 CAPSULE | Refills: 0 | Status: SHIPPED | OUTPATIENT
Start: 2018-07-02 | End: 2018-07-31

## 2018-07-02 RX ORDER — DEXTROAMPHETAMINE SACCHARATE, AMPHETAMINE ASPARTATE, DEXTROAMPHETAMINE SULFATE AND AMPHETAMINE SULFATE 7.5; 7.5; 7.5; 7.5 MG/1; MG/1; MG/1; MG/1
TABLET ORAL
Qty: 30 TABLET | Refills: 0 | Status: SHIPPED | OUTPATIENT
Start: 2018-07-02 | End: 2018-07-31

## 2018-07-02 NOTE — TELEPHONE ENCOUNTER
From: Martha Castillo  Sent: 7/2/2018 11:08 AM CDT  Subject: Medication Renewal Request    Martha Castillo would like a refill of the following medications:     Amphetamine-Dextroamphet ER (ADDERALL XR) 30 MG Oral Capsule SR 24 Hr Zoila Olivares MD]     a

## 2018-07-03 NOTE — TELEPHONE ENCOUNTER
Patient picked up 2 scripts for Adderall XR 30 MG & Adderall 30 MG on 07/03/18, [place] ID # [id number]

## 2018-07-03 NOTE — TELEPHONE ENCOUNTER
LoLo message has been sent to patient. Prescriptions have been placed in blue book in closet. Forward to [facility].

## 2018-07-31 RX ORDER — DEXTROAMPHETAMINE SACCHARATE, AMPHETAMINE ASPARTATE, DEXTROAMPHETAMINE SULFATE AND AMPHETAMINE SULFATE 7.5; 7.5; 7.5; 7.5 MG/1; MG/1; MG/1; MG/1
TABLET ORAL
Qty: 30 TABLET | Refills: 0 | Status: SHIPPED | OUTPATIENT
Start: 2018-07-31 | End: 2018-08-24

## 2018-07-31 RX ORDER — DEXTROAMPHETAMINE SACCHARATE, AMPHETAMINE ASPARTATE MONOHYDRATE, DEXTROAMPHETAMINE SULFATE AND AMPHETAMINE SULFATE 7.5; 7.5; 7.5; 7.5 MG/1; MG/1; MG/1; MG/1
30 CAPSULE, EXTENDED RELEASE ORAL EVERY MORNING
Qty: 30 CAPSULE | Refills: 0 | Status: SHIPPED | OUTPATIENT
Start: 2018-07-31 | End: 2018-08-24

## 2018-07-31 NOTE — TELEPHONE ENCOUNTER
From: Reji Patel  Sent: 7/31/2018 2:08 PM CDT  Subject: Medication Renewal Request    Reji Patel would like a refill of the following medications:     Amphetamine-Dextroamphet ER (ADDERALL XR) 30 MG Oral Capsule SR 24 Hr Lianet Molina MD]     a

## 2018-07-31 NOTE — TELEPHONE ENCOUNTER
LOV: 5/11/18  Last Refill:   Aderall 30mg  7/2/18 #30 1 RF  Adderall XR 30 7/2/18 #30 1 RF    No future appointments.

## 2018-08-01 NOTE — TELEPHONE ENCOUNTER
Pt picked up script for ADDERALL XR 30  MG & Amphetamine-dextroamphetamin 30 mg, 7/31/18.    Pt [place] DL # [id number]

## 2018-08-24 RX ORDER — DEXTROAMPHETAMINE SACCHARATE, AMPHETAMINE ASPARTATE, DEXTROAMPHETAMINE SULFATE AND AMPHETAMINE SULFATE 7.5; 7.5; 7.5; 7.5 MG/1; MG/1; MG/1; MG/1
TABLET ORAL
Qty: 30 TABLET | Refills: 0 | Status: SHIPPED | OUTPATIENT
Start: 2018-08-24 | End: 2018-09-20

## 2018-08-24 RX ORDER — DEXTROAMPHETAMINE SACCHARATE, AMPHETAMINE ASPARTATE MONOHYDRATE, DEXTROAMPHETAMINE SULFATE AND AMPHETAMINE SULFATE 7.5; 7.5; 7.5; 7.5 MG/1; MG/1; MG/1; MG/1
30 CAPSULE, EXTENDED RELEASE ORAL EVERY MORNING
Qty: 30 CAPSULE | Refills: 0 | Status: SHIPPED | OUTPATIENT
Start: 2018-08-24 | End: 2018-09-20

## 2018-08-24 RX ORDER — FLUTICASONE PROPIONATE 50 MCG
2 SPRAY, SUSPENSION (ML) NASAL DAILY
Qty: 3 BOTTLE | Refills: 3 | Status: SHIPPED | OUTPATIENT
Start: 2018-08-24 | End: 2019-08-15

## 2018-08-24 NOTE — TELEPHONE ENCOUNTER
Script printed, thanks  Fine to fill allergy/nasal spray that he wants filled 3 month supply 1 refill

## 2018-08-24 NOTE — TELEPHONE ENCOUNTER
See Meilleur[employer] message from patient. Last refill adderall 30mg and ER 7/31/18 #30 with 0 refills  Fluticasone spray listed as d/c med that was refilled 8/14/17 1 bottle with 6 refills  Patient has xyzal listed as a d/c med from 8/14/17 #30 with 6 refills.

## 2018-08-24 NOTE — TELEPHONE ENCOUNTER
From: Ebonie Downs  Sent: 8/24/2018 11:06 AM CDT  Subject: Medication Renewal Request    Ebonie Downs would like a refill of the following medications:     Amphetamine-Dextroamphet ER (ADDERALL XR) 30 MG Oral Capsule SR 24 Hr Vanda Jeffries MD]   Pa

## 2018-09-20 NOTE — TELEPHONE ENCOUNTER
Last refilled on 8/24/18 for # 30 with 0 refills  Last seen on 5/11/18  No future appointments. Thank you.

## 2018-09-24 RX ORDER — DEXTROAMPHETAMINE SACCHARATE, AMPHETAMINE ASPARTATE, DEXTROAMPHETAMINE SULFATE AND AMPHETAMINE SULFATE 7.5; 7.5; 7.5; 7.5 MG/1; MG/1; MG/1; MG/1
TABLET ORAL
Qty: 30 TABLET | Refills: 0 | Status: SHIPPED | OUTPATIENT
Start: 2018-09-24 | End: 2018-10-22

## 2018-09-24 RX ORDER — DEXTROAMPHETAMINE SACCHARATE, AMPHETAMINE ASPARTATE MONOHYDRATE, DEXTROAMPHETAMINE SULFATE AND AMPHETAMINE SULFATE 7.5; 7.5; 7.5; 7.5 MG/1; MG/1; MG/1; MG/1
30 CAPSULE, EXTENDED RELEASE ORAL EVERY MORNING
Qty: 30 CAPSULE | Refills: 0 | Status: SHIPPED | OUTPATIENT
Start: 2018-09-24 | End: 2018-10-22

## 2018-10-22 RX ORDER — DEXTROAMPHETAMINE SACCHARATE, AMPHETAMINE ASPARTATE MONOHYDRATE, DEXTROAMPHETAMINE SULFATE AND AMPHETAMINE SULFATE 7.5; 7.5; 7.5; 7.5 MG/1; MG/1; MG/1; MG/1
30 CAPSULE, EXTENDED RELEASE ORAL EVERY MORNING
Qty: 30 CAPSULE | Refills: 0 | Status: SHIPPED | OUTPATIENT
Start: 2018-10-22 | End: 2018-11-07

## 2018-10-22 RX ORDER — DEXTROAMPHETAMINE SACCHARATE, AMPHETAMINE ASPARTATE, DEXTROAMPHETAMINE SULFATE AND AMPHETAMINE SULFATE 7.5; 7.5; 7.5; 7.5 MG/1; MG/1; MG/1; MG/1
TABLET ORAL
Qty: 30 TABLET | Refills: 0 | Status: SHIPPED | OUTPATIENT
Start: 2018-10-22 | End: 2018-11-07

## 2018-11-07 NOTE — PROGRESS NOTES
Florinda Perkins is a 29year old male. HPI:   Pt is here to f/u on ADHD.     Meds prescribed:   Current Outpatient Medications:   •  [START ON 11/21/2018] Amphetamine-Dextroamphet ER (ADDERALL XR) 30 MG Oral Capsule SR 24 Hr, Take 1 capsule (30 mg total) b parents/teachers/boss/significant other: he states wife Lianne Cristina thinks he's doing well/great, they don't talk about it much now b/c this is new normal    Other health issues/concerns today: none, and his GERD is doing much better, he thinks was stress rel (ADHD), combined type  -Well controlled, CPM  F/u in 6 months  -spent much of visit brainstorming business strategies  Need for vaccination  -     IMMUNIZATION ADMINISTRATION  -     FLULAVAL INFLUENZA VACCINE QUAD PRESERVATIVE FREE 0.5 ML    Gastroesophage

## 2018-11-26 ENCOUNTER — PATIENT MESSAGE (OUTPATIENT)
Dept: FAMILY MEDICINE CLINIC | Facility: CLINIC | Age: 33
End: 2018-11-26

## 2018-11-26 DIAGNOSIS — M54.42 CHRONIC BILATERAL LOW BACK PAIN WITH LEFT-SIDED SCIATICA: Primary | ICD-10-CM

## 2018-11-26 DIAGNOSIS — G89.29 CHRONIC BILATERAL LOW BACK PAIN WITH LEFT-SIDED SCIATICA: Primary | ICD-10-CM

## 2018-11-26 NOTE — TELEPHONE ENCOUNTER
From: Franco Fritz  To: David Sánchez MD  Sent: 11/26/2018 6:16 AM CST  Subject: Referral Request    Hi Dr. Kishan Squires. I have had lower back issues/pain for a few years that has gotten progressively worse over time.  I think a few visits ago we touched ba

## 2018-12-18 ENCOUNTER — HOSPITAL ENCOUNTER (OUTPATIENT)
Dept: MRI IMAGING | Facility: HOSPITAL | Age: 33
Discharge: HOME | End: 2018-12-18
Attending: FAMILY MEDICINE
Payer: COMMERCIAL

## 2018-12-18 DIAGNOSIS — M54.42 CHRONIC BILATERAL LOW BACK PAIN WITH LEFT-SIDED SCIATICA: ICD-10-CM

## 2018-12-18 DIAGNOSIS — G89.29 CHRONIC BILATERAL LOW BACK PAIN WITH LEFT-SIDED SCIATICA: ICD-10-CM

## 2018-12-18 PROCEDURE — 72148 MRI LUMBAR SPINE W/O DYE: CPT | Performed by: FAMILY MEDICINE

## 2019-02-06 RX ORDER — DEXTROAMPHETAMINE SACCHARATE, AMPHETAMINE ASPARTATE MONOHYDRATE, DEXTROAMPHETAMINE SULFATE AND AMPHETAMINE SULFATE 7.5; 7.5; 7.5; 7.5 MG/1; MG/1; MG/1; MG/1
30 CAPSULE, EXTENDED RELEASE ORAL EVERY MORNING
Qty: 30 CAPSULE | Refills: 0 | Status: SHIPPED | OUTPATIENT
Start: 2019-02-06 | End: 2019-03-05

## 2019-02-06 RX ORDER — DEXTROAMPHETAMINE SACCHARATE, AMPHETAMINE ASPARTATE, DEXTROAMPHETAMINE SULFATE AND AMPHETAMINE SULFATE 7.5; 7.5; 7.5; 7.5 MG/1; MG/1; MG/1; MG/1
TABLET ORAL
Qty: 30 TABLET | Refills: 0 | Status: SHIPPED | OUTPATIENT
Start: 2019-02-06 | End: 2019-05-24

## 2019-02-06 NOTE — TELEPHONE ENCOUNTER
Patient picked up 2 scripts for Adderall XR 30 mg & Adderall 30 mg on 02/06/19, [place] DL # [id number]

## 2019-03-05 ENCOUNTER — OFFICE VISIT (OUTPATIENT)
Dept: FAMILY MEDICINE CLINIC | Facility: CLINIC | Age: 34
End: 2019-03-05
Payer: COMMERCIAL

## 2019-03-05 VITALS
DIASTOLIC BLOOD PRESSURE: 80 MMHG | RESPIRATION RATE: 14 BRPM | BODY MASS INDEX: 24.23 KG/M2 | WEIGHT: 154.38 LBS | HEIGHT: 67 IN | SYSTOLIC BLOOD PRESSURE: 120 MMHG | HEART RATE: 74 BPM | TEMPERATURE: 98 F

## 2019-03-05 DIAGNOSIS — M54.41 CHRONIC BILATERAL LOW BACK PAIN WITH RIGHT-SIDED SCIATICA: Primary | ICD-10-CM

## 2019-03-05 DIAGNOSIS — G89.29 CHRONIC BILATERAL LOW BACK PAIN WITH RIGHT-SIDED SCIATICA: Primary | ICD-10-CM

## 2019-03-05 DIAGNOSIS — F90.2 ATTENTION DEFICIT HYPERACTIVITY DISORDER (ADHD), COMBINED TYPE: ICD-10-CM

## 2019-03-05 PROCEDURE — 99214 OFFICE O/P EST MOD 30 MIN: CPT | Performed by: FAMILY MEDICINE

## 2019-03-05 RX ORDER — CYCLOBENZAPRINE HCL 10 MG
10 TABLET ORAL EVERY EVENING
Qty: 14 TABLET | Refills: 0 | Status: SHIPPED | OUTPATIENT
Start: 2019-03-05 | End: 2019-03-19

## 2019-03-05 RX ORDER — DEXTROAMPHETAMINE SACCHARATE, AMPHETAMINE ASPARTATE MONOHYDRATE, DEXTROAMPHETAMINE SULFATE AND AMPHETAMINE SULFATE 7.5; 7.5; 7.5; 7.5 MG/1; MG/1; MG/1; MG/1
30 CAPSULE, EXTENDED RELEASE ORAL DAILY
Qty: 30 CAPSULE | Refills: 0 | Status: SHIPPED | OUTPATIENT
Start: 2019-05-04 | End: 2019-05-23

## 2019-03-05 RX ORDER — DEXTROAMPHETAMINE SACCHARATE, AMPHETAMINE ASPARTATE MONOHYDRATE, DEXTROAMPHETAMINE SULFATE AND AMPHETAMINE SULFATE 7.5; 7.5; 7.5; 7.5 MG/1; MG/1; MG/1; MG/1
30 CAPSULE, EXTENDED RELEASE ORAL DAILY
Qty: 30 CAPSULE | Refills: 0 | Status: SHIPPED | OUTPATIENT
Start: 2019-03-05 | End: 2019-04-04

## 2019-03-05 RX ORDER — METHYLPREDNISOLONE 4 MG/1
TABLET ORAL
Qty: 1 KIT | Refills: 0 | Status: SHIPPED | OUTPATIENT
Start: 2019-03-05 | End: 2019-09-25

## 2019-03-05 RX ORDER — DEXTROAMPHETAMINE SACCHARATE, AMPHETAMINE ASPARTATE MONOHYDRATE, DEXTROAMPHETAMINE SULFATE AND AMPHETAMINE SULFATE 7.5; 7.5; 7.5; 7.5 MG/1; MG/1; MG/1; MG/1
30 CAPSULE, EXTENDED RELEASE ORAL DAILY
Qty: 30 CAPSULE | Refills: 0 | Status: SHIPPED | OUTPATIENT
Start: 2019-04-04 | End: 2019-05-04

## 2019-03-05 RX ORDER — DEXTROAMPHETAMINE SACCHARATE, AMPHETAMINE ASPARTATE, DEXTROAMPHETAMINE SULFATE AND AMPHETAMINE SULFATE 7.5; 7.5; 7.5; 7.5 MG/1; MG/1; MG/1; MG/1
TABLET ORAL
Qty: 30 TABLET | Refills: 0 | Status: SHIPPED | OUTPATIENT
Start: 2019-03-05 | End: 2019-04-04

## 2019-03-05 RX ORDER — DEXTROAMPHETAMINE SACCHARATE, AMPHETAMINE ASPARTATE, DEXTROAMPHETAMINE SULFATE AND AMPHETAMINE SULFATE 7.5; 7.5; 7.5; 7.5 MG/1; MG/1; MG/1; MG/1
TABLET ORAL
Qty: 30 TABLET | Refills: 0 | Status: SHIPPED | OUTPATIENT
Start: 2019-05-04 | End: 2019-05-23

## 2019-03-05 RX ORDER — DEXTROAMPHETAMINE SACCHARATE, AMPHETAMINE ASPARTATE, DEXTROAMPHETAMINE SULFATE AND AMPHETAMINE SULFATE 7.5; 7.5; 7.5; 7.5 MG/1; MG/1; MG/1; MG/1
TABLET ORAL
Qty: 30 TABLET | Refills: 0 | Status: SHIPPED | OUTPATIENT
Start: 2019-04-04 | End: 2019-05-04

## 2019-03-05 NOTE — PROGRESS NOTES
Concepcion aMthew is a 29year old male. HPI:   Patient here to f/u on on his chronic low back pain. It still bothers him, but having worsening and more persistent R buttock and leg pain. Hard to get comfortable sometimes.   It's impacting his quality po q afternoon prn Disp: 30 tablet Rfl: 0   amphetamine-dextroamphetamine (ADDERALL) 30 MG Oral Tab 1 po qafternoon Disp: 30 tablet Rfl: 0   Fluticasone Propionate 50 MCG/ACT Nasal Suspension 2 sprays by Each Nare route daily.  Disp: 3 Bottle Rfl: 3   Momet sciatica  -worsening; MRI without immediate concerning pathology; opted to start with medrol pack, muscle relaxant and PHYSICAL THERAPY to help with functional/mechanical muscular aspect; next step likely pain management if needed  -     OP REFERRAL TO [facility]

## 2019-03-12 ENCOUNTER — OFFICE VISIT (OUTPATIENT)
Dept: PHYSICAL THERAPY | Age: 34
End: 2019-03-12
Attending: FAMILY MEDICINE
Payer: COMMERCIAL

## 2019-03-12 DIAGNOSIS — G89.29 CHRONIC BILATERAL LOW BACK PAIN WITH RIGHT-SIDED SCIATICA: ICD-10-CM

## 2019-03-12 DIAGNOSIS — M54.41 CHRONIC BILATERAL LOW BACK PAIN WITH RIGHT-SIDED SCIATICA: ICD-10-CM

## 2019-03-12 PROCEDURE — 97110 THERAPEUTIC EXERCISES: CPT

## 2019-03-12 PROCEDURE — 97161 PT EVAL LOW COMPLEX 20 MIN: CPT

## 2019-03-19 ENCOUNTER — OFFICE VISIT (OUTPATIENT)
Dept: PHYSICAL THERAPY | Age: 34
End: 2019-03-19
Attending: FAMILY MEDICINE
Payer: COMMERCIAL

## 2019-03-19 DIAGNOSIS — M54.41 CHRONIC BILATERAL LOW BACK PAIN WITH RIGHT-SIDED SCIATICA: ICD-10-CM

## 2019-03-19 DIAGNOSIS — G89.29 CHRONIC BILATERAL LOW BACK PAIN WITH RIGHT-SIDED SCIATICA: ICD-10-CM

## 2019-03-19 PROCEDURE — 97140 MANUAL THERAPY 1/> REGIONS: CPT

## 2019-03-19 PROCEDURE — 97110 THERAPEUTIC EXERCISES: CPT

## 2019-03-19 NOTE — PROGRESS NOTES
Dx:  -Chronic bilateral low back pain with right-sided sciatica    Authorized # of Visits:  No prior authorization is required per appointment notes        Next MD visit: none scheduled  Fall Risk: standard           Precautions: n/a             Subjective -Instruction in supine bridge with emphasis on neutral spine, use of gluteus and lower placement to maximize outcome; discussed intent/purpose                       Charges:   Manual Therapy x 1  Therapeutic excercise x 2  Total Timed Treatment: 40 min UPA  Special tests:    -SLR R 50  Muscle strength:   -Hamstring shortness B    Today’s Treatment and Response:  -Joint mobilization R UPA L4 Gr III-; extension restoration prone and quadruped    ASSESSMENT:   -Patient presenting with impairments in low sho treatment and while under my care.     X___________________________________________________ Date____________________    Certification From: 7/30/7643  To:6/10/2019

## 2019-03-21 ENCOUNTER — OFFICE VISIT (OUTPATIENT)
Dept: PHYSICAL THERAPY | Age: 34
End: 2019-03-21
Attending: FAMILY MEDICINE
Payer: COMMERCIAL

## 2019-03-21 DIAGNOSIS — G89.29 CHRONIC BILATERAL LOW BACK PAIN WITH RIGHT-SIDED SCIATICA: ICD-10-CM

## 2019-03-21 DIAGNOSIS — M54.41 CHRONIC BILATERAL LOW BACK PAIN WITH RIGHT-SIDED SCIATICA: ICD-10-CM

## 2019-03-21 PROCEDURE — 97110 THERAPEUTIC EXERCISES: CPT

## 2019-03-21 NOTE — PROGRESS NOTES
Dx:  -Chronic bilateral low back pain with right-sided sciatica    Authorized # of Visits:  No prior authorization is required per appointment notes        Next MD visit: none scheduled  Fall Risk: standard           Precautions: n/a             Subjective second hold - progression discussed per patient self assessment  -Supine 90/90 self neural mobilization - discussed self progression    -Supine segmental LTR with trunk engagement                         Charges:    Therapeutic excercise x 3  Total Timed Tr testing:    -R UPA  Special tests:    -SLR R 50  Muscle strength:   -Hamstring shortness B    Today’s Treatment and Response:  -Joint mobilization R UPA L4 Gr III-; extension restoration prone and quadruped    ASSESSMENT:   -Patient presenting with impairm this plan of treatment and while under my care.     X___________________________________________________ Date____________________    Certification From: 3/32/0156  To:6/10/2019

## 2019-04-02 ENCOUNTER — APPOINTMENT (OUTPATIENT)
Dept: PHYSICAL THERAPY | Age: 34
End: 2019-04-02
Attending: FAMILY MEDICINE
Payer: COMMERCIAL

## 2019-04-09 ENCOUNTER — APPOINTMENT (OUTPATIENT)
Dept: PHYSICAL THERAPY | Age: 34
End: 2019-04-09
Attending: FAMILY MEDICINE
Payer: COMMERCIAL

## 2019-04-11 ENCOUNTER — APPOINTMENT (OUTPATIENT)
Dept: PHYSICAL THERAPY | Age: 34
End: 2019-04-11
Attending: FAMILY MEDICINE
Payer: COMMERCIAL

## 2019-04-16 ENCOUNTER — APPOINTMENT (OUTPATIENT)
Dept: PHYSICAL THERAPY | Age: 34
End: 2019-04-16
Attending: FAMILY MEDICINE
Payer: COMMERCIAL

## 2019-04-18 ENCOUNTER — APPOINTMENT (OUTPATIENT)
Dept: PHYSICAL THERAPY | Age: 34
End: 2019-04-18
Attending: FAMILY MEDICINE
Payer: COMMERCIAL

## 2019-05-24 RX ORDER — DEXTROAMPHETAMINE SACCHARATE, AMPHETAMINE ASPARTATE MONOHYDRATE, DEXTROAMPHETAMINE SULFATE AND AMPHETAMINE SULFATE 7.5; 7.5; 7.5; 7.5 MG/1; MG/1; MG/1; MG/1
30 CAPSULE, EXTENDED RELEASE ORAL DAILY
Qty: 30 CAPSULE | Refills: 0 | Status: SHIPPED | OUTPATIENT
Start: 2019-05-24 | End: 2019-06-24

## 2019-05-24 RX ORDER — DEXTROAMPHETAMINE SACCHARATE, AMPHETAMINE ASPARTATE, DEXTROAMPHETAMINE SULFATE AND AMPHETAMINE SULFATE 7.5; 7.5; 7.5; 7.5 MG/1; MG/1; MG/1; MG/1
TABLET ORAL
Qty: 30 TABLET | Refills: 0 | Status: SHIPPED | OUTPATIENT
Start: 2019-05-24 | End: 2019-06-24

## 2019-06-24 RX ORDER — DEXTROAMPHETAMINE SACCHARATE, AMPHETAMINE ASPARTATE MONOHYDRATE, DEXTROAMPHETAMINE SULFATE AND AMPHETAMINE SULFATE 7.5; 7.5; 7.5; 7.5 MG/1; MG/1; MG/1; MG/1
30 CAPSULE, EXTENDED RELEASE ORAL DAILY
Qty: 30 CAPSULE | Refills: 0 | Status: SHIPPED | OUTPATIENT
Start: 2019-06-24 | End: 2019-07-18

## 2019-06-24 RX ORDER — DEXTROAMPHETAMINE SACCHARATE, AMPHETAMINE ASPARTATE, DEXTROAMPHETAMINE SULFATE AND AMPHETAMINE SULFATE 7.5; 7.5; 7.5; 7.5 MG/1; MG/1; MG/1; MG/1
TABLET ORAL
Qty: 30 TABLET | Refills: 0 | Status: SHIPPED | OUTPATIENT
Start: 2019-06-24 | End: 2019-07-18

## 2019-06-24 NOTE — TELEPHONE ENCOUNTER
PT CALLED AND ADV NEEDS REFILLS OF     amphetamine-dextroamphetamine (ADDERALL) 30 MG Oral Tab    AND     Amphetamine-Dextroamphet ER (ADDERALL XR) 30 MG Oral Capsule SR 24 Hr    PLEASE CALL WHEN READY FOR P/U     THANK YOU

## 2019-07-18 RX ORDER — DEXTROAMPHETAMINE SACCHARATE, AMPHETAMINE ASPARTATE, DEXTROAMPHETAMINE SULFATE AND AMPHETAMINE SULFATE 7.5; 7.5; 7.5; 7.5 MG/1; MG/1; MG/1; MG/1
TABLET ORAL
Qty: 30 TABLET | Refills: 0 | Status: SHIPPED | OUTPATIENT
Start: 2019-07-18 | End: 2019-08-15

## 2019-07-18 RX ORDER — DEXTROAMPHETAMINE SACCHARATE, AMPHETAMINE ASPARTATE MONOHYDRATE, DEXTROAMPHETAMINE SULFATE AND AMPHETAMINE SULFATE 7.5; 7.5; 7.5; 7.5 MG/1; MG/1; MG/1; MG/1
30 CAPSULE, EXTENDED RELEASE ORAL DAILY
Qty: 30 CAPSULE | Refills: 0 | Status: SHIPPED | OUTPATIENT
Start: 2019-07-18 | End: 2019-08-15

## 2019-08-15 NOTE — TELEPHONE ENCOUNTER
adderall Last refilled on 7/18/19 for # 30 with 0 refills  Fluticasone 8/24/18 #3 with 3 refills  Last OV 3/5/19  No future appointments. Thank you.       Fluticasone Propionate 50 MCG/ACT Nasal Suspension Lovett Holstein, MD]       Patient Comment: shmuel Burrell

## 2019-08-16 RX ORDER — DEXTROAMPHETAMINE SACCHARATE, AMPHETAMINE ASPARTATE, DEXTROAMPHETAMINE SULFATE AND AMPHETAMINE SULFATE 7.5; 7.5; 7.5; 7.5 MG/1; MG/1; MG/1; MG/1
TABLET ORAL
Qty: 30 TABLET | Refills: 0 | Status: SHIPPED | OUTPATIENT
Start: 2019-08-16 | End: 2019-09-17

## 2019-08-16 RX ORDER — FLUTICASONE PROPIONATE 50 MCG
2 SPRAY, SUSPENSION (ML) NASAL DAILY
Qty: 3 BOTTLE | Refills: 3 | Status: SHIPPED | OUTPATIENT
Start: 2019-08-16 | End: 2020-08-06

## 2019-08-16 RX ORDER — DEXTROAMPHETAMINE SACCHARATE, AMPHETAMINE ASPARTATE MONOHYDRATE, DEXTROAMPHETAMINE SULFATE AND AMPHETAMINE SULFATE 7.5; 7.5; 7.5; 7.5 MG/1; MG/1; MG/1; MG/1
30 CAPSULE, EXTENDED RELEASE ORAL DAILY
Qty: 30 CAPSULE | Refills: 0 | Status: SHIPPED | OUTPATIENT
Start: 2019-08-16 | End: 2019-09-14

## 2019-08-16 RX ORDER — LEVOCETIRIZINE DIHYDROCHLORIDE 5 MG/1
5 TABLET, FILM COATED ORAL EVERY EVENING
Qty: 90 TABLET | Refills: 3 | Status: SHIPPED | OUTPATIENT
Start: 2019-08-16 | End: 2020-08-06

## 2019-09-14 NOTE — TELEPHONE ENCOUNTER
Last refilled on 8/16/19 for # 30 with 0 refills  Last OV 3/5/19  No future appointments. Thank you.

## 2019-09-16 RX ORDER — DEXTROAMPHETAMINE SACCHARATE, AMPHETAMINE ASPARTATE MONOHYDRATE, DEXTROAMPHETAMINE SULFATE AND AMPHETAMINE SULFATE 7.5; 7.5; 7.5; 7.5 MG/1; MG/1; MG/1; MG/1
30 CAPSULE, EXTENDED RELEASE ORAL DAILY
Qty: 30 CAPSULE | Refills: 0 | OUTPATIENT
Start: 2019-09-16 | End: 2019-10-16

## 2019-09-16 RX ORDER — DEXTROAMPHETAMINE SACCHARATE, AMPHETAMINE ASPARTATE, DEXTROAMPHETAMINE SULFATE AND AMPHETAMINE SULFATE 7.5; 7.5; 7.5; 7.5 MG/1; MG/1; MG/1; MG/1
TABLET ORAL
Qty: 30 TABLET | Refills: 0 | OUTPATIENT
Start: 2019-09-16 | End: 2019-10-10

## 2019-09-16 RX ORDER — DEXTROAMPHETAMINE SACCHARATE, AMPHETAMINE ASPARTATE MONOHYDRATE, DEXTROAMPHETAMINE SULFATE AND AMPHETAMINE SULFATE 7.5; 7.5; 7.5; 7.5 MG/1; MG/1; MG/1; MG/1
30 CAPSULE, EXTENDED RELEASE ORAL DAILY
Qty: 30 CAPSULE | Refills: 0 | Status: SHIPPED | OUTPATIENT
Start: 2019-09-16 | End: 2019-10-11

## 2019-09-17 RX ORDER — DEXTROAMPHETAMINE SACCHARATE, AMPHETAMINE ASPARTATE MONOHYDRATE, DEXTROAMPHETAMINE SULFATE AND AMPHETAMINE SULFATE 7.5; 7.5; 7.5; 7.5 MG/1; MG/1; MG/1; MG/1
30 CAPSULE, EXTENDED RELEASE ORAL DAILY
Qty: 30 CAPSULE | Refills: 0 | OUTPATIENT
Start: 2019-09-17 | End: 2019-10-17

## 2019-09-17 RX ORDER — DEXTROAMPHETAMINE SACCHARATE, AMPHETAMINE ASPARTATE, DEXTROAMPHETAMINE SULFATE AND AMPHETAMINE SULFATE 7.5; 7.5; 7.5; 7.5 MG/1; MG/1; MG/1; MG/1
TABLET ORAL
Qty: 30 TABLET | Refills: 0 | Status: SHIPPED | OUTPATIENT
Start: 2019-09-17 | End: 2019-10-11

## 2019-09-17 NOTE — TELEPHONE ENCOUNTER
Spoke with the pt and advised of the need for an OV  He needs an appt after [address]- can you see him on [email or web address]? You will be gone after that date.

## 2019-09-18 NOTE — TELEPHONE ENCOUNTER
Spoke with the pt and advised that Dr. Kerry Maguire can not see him on Oct 2 but can see him next [facility] 9/25/19 @ 409  He states that he needs to check to see if he can make this appt  Advised that if he can no need to call but if not please call and reschedule- he

## 2019-09-30 NOTE — PROGRESS NOTES
Ember Mckee is a 36year old male. HPI:   Pt is here to f/u on ADHD.     Meds prescribed:   Current Outpatient Medications:   •  amphetamine-dextroamphetamine (ADDERALL) 30 MG Oral Tab, 1 po q afternoon prn, Disp: 30 tablet, Rfl: 0  •  Amphetamine-Dext breath with exertion  CARDIOVASCULAR: denies chest pain on exertion  GI: denies abdominal pain/n/v  PSYCH: no concerns, good mood, functioning well  NEURO: denies headaches    EXAM:   /80   Pulse 82   Temp 98.3 °F (36.8 °C) (Temporal)   Resp 16   Ht

## 2019-10-11 RX ORDER — DEXTROAMPHETAMINE SACCHARATE, AMPHETAMINE ASPARTATE, DEXTROAMPHETAMINE SULFATE AND AMPHETAMINE SULFATE 7.5; 7.5; 7.5; 7.5 MG/1; MG/1; MG/1; MG/1
TABLET ORAL
Qty: 30 TABLET | Refills: 0 | Status: SHIPPED | OUTPATIENT
Start: 2019-10-11 | End: 2019-11-06

## 2019-10-11 RX ORDER — DEXTROAMPHETAMINE SACCHARATE, AMPHETAMINE ASPARTATE MONOHYDRATE, DEXTROAMPHETAMINE SULFATE AND AMPHETAMINE SULFATE 7.5; 7.5; 7.5; 7.5 MG/1; MG/1; MG/1; MG/1
30 CAPSULE, EXTENDED RELEASE ORAL DAILY
Qty: 30 CAPSULE | Refills: 0 | Status: SHIPPED | OUTPATIENT
Start: 2019-10-11 | End: 2019-11-06

## 2019-10-11 NOTE — TELEPHONE ENCOUNTER
Adderall 30mg  Last refill: 9/17/2019 #30 with 0 refill    Adderall 30mg ER:  Last refill: 9/16/2019 #30 with 0 refills    Last Visit: 9/25/2019  Next Visit: No future appointments. Forward to Dr. Emir Viera (covering provider) please advise on refills.  Agatha Ly

## 2019-11-06 RX ORDER — DEXTROAMPHETAMINE SACCHARATE, AMPHETAMINE ASPARTATE, DEXTROAMPHETAMINE SULFATE AND AMPHETAMINE SULFATE 7.5; 7.5; 7.5; 7.5 MG/1; MG/1; MG/1; MG/1
TABLET ORAL
Qty: 30 TABLET | Refills: 0 | Status: SHIPPED | OUTPATIENT
Start: 2019-11-06 | End: 2019-12-02

## 2019-11-06 RX ORDER — DEXTROAMPHETAMINE SACCHARATE, AMPHETAMINE ASPARTATE MONOHYDRATE, DEXTROAMPHETAMINE SULFATE AND AMPHETAMINE SULFATE 7.5; 7.5; 7.5; 7.5 MG/1; MG/1; MG/1; MG/1
30 CAPSULE, EXTENDED RELEASE ORAL DAILY
Qty: 30 CAPSULE | Refills: 0 | Status: SHIPPED | OUTPATIENT
Start: 2019-11-06 | End: 2019-12-02

## 2019-12-02 RX ORDER — DEXTROAMPHETAMINE SACCHARATE, AMPHETAMINE ASPARTATE, DEXTROAMPHETAMINE SULFATE AND AMPHETAMINE SULFATE 7.5; 7.5; 7.5; 7.5 MG/1; MG/1; MG/1; MG/1
TABLET ORAL
Qty: 30 TABLET | Refills: 0 | Status: SHIPPED | OUTPATIENT
Start: 2019-12-02 | End: 2019-12-27

## 2019-12-02 RX ORDER — DEXTROAMPHETAMINE SACCHARATE, AMPHETAMINE ASPARTATE MONOHYDRATE, DEXTROAMPHETAMINE SULFATE AND AMPHETAMINE SULFATE 7.5; 7.5; 7.5; 7.5 MG/1; MG/1; MG/1; MG/1
30 CAPSULE, EXTENDED RELEASE ORAL DAILY
Qty: 30 CAPSULE | Refills: 0 | Status: SHIPPED | OUTPATIENT
Start: 2019-12-02 | End: 2019-12-27

## 2019-12-27 RX ORDER — DEXTROAMPHETAMINE SACCHARATE, AMPHETAMINE ASPARTATE MONOHYDRATE, DEXTROAMPHETAMINE SULFATE AND AMPHETAMINE SULFATE 7.5; 7.5; 7.5; 7.5 MG/1; MG/1; MG/1; MG/1
30 CAPSULE, EXTENDED RELEASE ORAL DAILY
Qty: 30 CAPSULE | Refills: 0 | Status: SHIPPED | OUTPATIENT
Start: 2019-12-27 | End: 2020-01-26

## 2019-12-27 RX ORDER — DEXTROAMPHETAMINE SACCHARATE, AMPHETAMINE ASPARTATE, DEXTROAMPHETAMINE SULFATE AND AMPHETAMINE SULFATE 7.5; 7.5; 7.5; 7.5 MG/1; MG/1; MG/1; MG/1
TABLET ORAL
Qty: 30 TABLET | Refills: 0 | Status: SHIPPED | OUTPATIENT
Start: 2019-12-27 | End: 2020-01-23

## 2020-01-16 ENCOUNTER — OFFICE VISIT (OUTPATIENT)
Dept: FAMILY MEDICINE CLINIC | Facility: CLINIC | Age: 35
End: 2020-01-16
Payer: COMMERCIAL

## 2020-01-16 VITALS
DIASTOLIC BLOOD PRESSURE: 74 MMHG | TEMPERATURE: 98 F | OXYGEN SATURATION: 99 % | SYSTOLIC BLOOD PRESSURE: 118 MMHG | WEIGHT: 155 LBS | HEART RATE: 97 BPM | BODY MASS INDEX: 24 KG/M2 | RESPIRATION RATE: 16 BRPM

## 2020-01-16 DIAGNOSIS — J10.1 INFLUENZA A: ICD-10-CM

## 2020-01-16 DIAGNOSIS — R68.89 FLU-LIKE SYMPTOMS: Primary | ICD-10-CM

## 2020-01-16 DIAGNOSIS — R05.9 COUGH: ICD-10-CM

## 2020-01-16 LAB
POCT INFLUENZA A: POSITIVE
POCT INFLUENZA B: NEGATIVE

## 2020-01-16 PROCEDURE — 87502 INFLUENZA DNA AMP PROBE: CPT | Performed by: NURSE PRACTITIONER

## 2020-01-16 PROCEDURE — 99213 OFFICE O/P EST LOW 20 MIN: CPT | Performed by: NURSE PRACTITIONER

## 2020-01-16 RX ORDER — BENZONATATE 200 MG/1
200 CAPSULE ORAL 3 TIMES DAILY PRN
Qty: 20 CAPSULE | Refills: 0 | Status: SHIPPED | OUTPATIENT
Start: 2020-01-16 | End: 2020-10-05 | Stop reason: ALTCHOICE

## 2020-01-16 NOTE — PROGRESS NOTES
CHIEF COMPLAINT:   Patient presents with:  Cough: congestion/body aches/fever x 3 days. max temp 102.5      HPI:   Reji Patel is a 36year old male who presents for sudden onset fever, body aches, chills and upper respiratory symptoms for 3 days.  TMAX SKIN: no rashes or abnormal skin lesions  HEENT: See HPI  LUNGS: denies shortness of breath or wheezing, See HPI  CARDIOVASCULAR: denies chest pain or palpitations   GI: denies N/V/C or abdominal pain  NEURO: Denies headaches    EXAM:   /74 (BP Locat Sig: Take 1 capsule (200 mg total) by mouth 3 (three) times daily as needed for cough. Risks, benefits, and side effects of medication explained and discussed. Patient Instructions     Influenza (Adult)    Influenza is also called the flu.  It is · Stay home until your fever has been gone for at least 24 hours without using medicine to reduce fever. Follow-up care  Follow up with your healthcare provider, or as advised, if you are not getting better over the next week.   If you are age 72 or older,

## 2020-01-27 RX ORDER — DEXTROAMPHETAMINE SACCHARATE, AMPHETAMINE ASPARTATE, DEXTROAMPHETAMINE SULFATE AND AMPHETAMINE SULFATE 7.5; 7.5; 7.5; 7.5 MG/1; MG/1; MG/1; MG/1
30 TABLET ORAL DAILY
Qty: 30 TABLET | Refills: 0 | Status: SHIPPED | OUTPATIENT
Start: 2020-02-27 | End: 2020-03-23

## 2020-01-27 RX ORDER — DEXTROAMPHETAMINE SACCHARATE, AMPHETAMINE ASPARTATE MONOHYDRATE, DEXTROAMPHETAMINE SULFATE AND AMPHETAMINE SULFATE 7.5; 7.5; 7.5; 7.5 MG/1; MG/1; MG/1; MG/1
30 CAPSULE, EXTENDED RELEASE ORAL DAILY
Qty: 30 CAPSULE | Refills: 0 | Status: SHIPPED | OUTPATIENT
Start: 2020-02-27 | End: 2020-03-23

## 2020-01-27 RX ORDER — DEXTROAMPHETAMINE SACCHARATE, AMPHETAMINE ASPARTATE, DEXTROAMPHETAMINE SULFATE AND AMPHETAMINE SULFATE 7.5; 7.5; 7.5; 7.5 MG/1; MG/1; MG/1; MG/1
30 TABLET ORAL DAILY
Qty: 30 TABLET | Refills: 0 | Status: SHIPPED | OUTPATIENT
Start: 2020-01-27 | End: 2020-02-26

## 2020-01-27 RX ORDER — DEXTROAMPHETAMINE SACCHARATE, AMPHETAMINE ASPARTATE MONOHYDRATE, DEXTROAMPHETAMINE SULFATE AND AMPHETAMINE SULFATE 7.5; 7.5; 7.5; 7.5 MG/1; MG/1; MG/1; MG/1
30 CAPSULE, EXTENDED RELEASE ORAL DAILY
Qty: 30 CAPSULE | Refills: 0 | Status: SHIPPED | OUTPATIENT
Start: 2020-01-27 | End: 2020-02-26

## 2020-01-27 NOTE — TELEPHONE ENCOUNTER
Patient needs a refill on his fast acting and extended release adderalls called into [facility] on [address] please call back when meds have been called in.

## 2020-01-28 NOTE — TELEPHONE ENCOUNTER
Spoke with the pt and scheduled follow up  Future Appointments   Date Time Provider Kwame Guerra   4/1/2020  5:30 PM Alin Carey MD [facility] MASHA Taylor

## 2020-02-26 RX ORDER — DEXTROAMPHETAMINE SACCHARATE, AMPHETAMINE ASPARTATE, DEXTROAMPHETAMINE SULFATE AND AMPHETAMINE SULFATE 7.5; 7.5; 7.5; 7.5 MG/1; MG/1; MG/1; MG/1
30 TABLET ORAL DAILY
Qty: 30 TABLET | Refills: 0 | OUTPATIENT
Start: 2020-02-26 | End: 2020-03-27

## 2020-02-26 RX ORDER — DEXTROAMPHETAMINE SACCHARATE, AMPHETAMINE ASPARTATE MONOHYDRATE, DEXTROAMPHETAMINE SULFATE AND AMPHETAMINE SULFATE 7.5; 7.5; 7.5; 7.5 MG/1; MG/1; MG/1; MG/1
30 CAPSULE, EXTENDED RELEASE ORAL DAILY
Qty: 30 CAPSULE | Refills: 0 | OUTPATIENT
Start: 2020-02-26 | End: 2020-03-27

## 2020-02-26 NOTE — TELEPHONE ENCOUNTER
Refills denied, called elisha [phone], scripts were already sent with a start date 2/27/20 for both 30mg.

## 2020-03-23 RX ORDER — DEXTROAMPHETAMINE SACCHARATE, AMPHETAMINE ASPARTATE, DEXTROAMPHETAMINE SULFATE AND AMPHETAMINE SULFATE 7.5; 7.5; 7.5; 7.5 MG/1; MG/1; MG/1; MG/1
30 TABLET ORAL DAILY
Qty: 30 TABLET | Refills: 0 | Status: SHIPPED | OUTPATIENT
Start: 2020-03-23 | End: 2020-04-17

## 2020-03-23 RX ORDER — DEXTROAMPHETAMINE SACCHARATE, AMPHETAMINE ASPARTATE MONOHYDRATE, DEXTROAMPHETAMINE SULFATE AND AMPHETAMINE SULFATE 7.5; 7.5; 7.5; 7.5 MG/1; MG/1; MG/1; MG/1
30 CAPSULE, EXTENDED RELEASE ORAL DAILY
Qty: 30 CAPSULE | Refills: 0 | Status: SHIPPED | OUTPATIENT
Start: 2020-03-23 | End: 2020-04-17

## 2020-03-23 NOTE — TELEPHONE ENCOUNTER
Both meds Last refilled on 2/27/20 for # 30 with 0 refills  Last OV 9/25/19 for ADD  Future Appointments   Date Time Provider Kwame Guerra   4/1/2020  5:30 PM Kelly Curry MD [facility] Essence Fernandes        Thank you.

## 2020-04-01 NOTE — PROGRESS NOTES
Virtual/Telephone Check-In    Franco Fritz verbally consents to a Virtual/Telephone Check-In service on 04/01/20. Patient understands and accepts financial responsibility for any deductible, co-insurance and/or co-pays associated with this service.

## 2020-04-17 RX ORDER — DEXTROAMPHETAMINE SACCHARATE, AMPHETAMINE ASPARTATE MONOHYDRATE, DEXTROAMPHETAMINE SULFATE AND AMPHETAMINE SULFATE 7.5; 7.5; 7.5; 7.5 MG/1; MG/1; MG/1; MG/1
30 CAPSULE, EXTENDED RELEASE ORAL DAILY
Qty: 30 CAPSULE | Refills: 0 | Status: SHIPPED | OUTPATIENT
Start: 2020-04-17 | End: 2020-05-14

## 2020-04-17 RX ORDER — DEXTROAMPHETAMINE SACCHARATE, AMPHETAMINE ASPARTATE, DEXTROAMPHETAMINE SULFATE AND AMPHETAMINE SULFATE 7.5; 7.5; 7.5; 7.5 MG/1; MG/1; MG/1; MG/1
30 TABLET ORAL DAILY
Qty: 30 TABLET | Refills: 0 | Status: SHIPPED | OUTPATIENT
Start: 2020-04-17 | End: 2020-05-14

## 2020-04-17 NOTE — TELEPHONE ENCOUNTER
Refill request per protocol: none  Both last refilled 3/23/20 #30  LOV 4/1/20 virtual phone visit  No future appt  Routed to PCP to advise

## 2020-05-14 RX ORDER — DEXTROAMPHETAMINE SACCHARATE, AMPHETAMINE ASPARTATE, DEXTROAMPHETAMINE SULFATE AND AMPHETAMINE SULFATE 7.5; 7.5; 7.5; 7.5 MG/1; MG/1; MG/1; MG/1
30 TABLET ORAL DAILY
Qty: 30 TABLET | Refills: 0 | Status: SHIPPED | OUTPATIENT
Start: 2020-05-14 | End: 2020-06-09

## 2020-05-14 RX ORDER — DEXTROAMPHETAMINE SACCHARATE, AMPHETAMINE ASPARTATE MONOHYDRATE, DEXTROAMPHETAMINE SULFATE AND AMPHETAMINE SULFATE 7.5; 7.5; 7.5; 7.5 MG/1; MG/1; MG/1; MG/1
30 CAPSULE, EXTENDED RELEASE ORAL DAILY
Qty: 30 CAPSULE | Refills: 0 | Status: SHIPPED | OUTPATIENT
Start: 2020-05-14 | End: 2020-06-09

## 2020-05-14 NOTE — TELEPHONE ENCOUNTER
No refill protocol for this medication. Both meds:  Last refill: 4/17/2020 #30 with 0 refills      Last Visit: 4/01/2020  Next Visit: No future appointments. Forward to Dr. Marium Bruno please advise on refills. Thanks.

## 2020-06-09 RX ORDER — DEXTROAMPHETAMINE SACCHARATE, AMPHETAMINE ASPARTATE, DEXTROAMPHETAMINE SULFATE AND AMPHETAMINE SULFATE 7.5; 7.5; 7.5; 7.5 MG/1; MG/1; MG/1; MG/1
30 TABLET ORAL DAILY
Qty: 30 TABLET | Refills: 0 | Status: SHIPPED | OUTPATIENT
Start: 2020-06-09 | End: 2020-07-08

## 2020-06-09 RX ORDER — DEXTROAMPHETAMINE SACCHARATE, AMPHETAMINE ASPARTATE MONOHYDRATE, DEXTROAMPHETAMINE SULFATE AND AMPHETAMINE SULFATE 7.5; 7.5; 7.5; 7.5 MG/1; MG/1; MG/1; MG/1
30 CAPSULE, EXTENDED RELEASE ORAL DAILY
Qty: 30 CAPSULE | Refills: 0 | Status: SHIPPED | OUTPATIENT
Start: 2020-06-09 | End: 2020-07-08

## 2020-07-08 RX ORDER — DEXTROAMPHETAMINE SACCHARATE, AMPHETAMINE ASPARTATE, DEXTROAMPHETAMINE SULFATE AND AMPHETAMINE SULFATE 7.5; 7.5; 7.5; 7.5 MG/1; MG/1; MG/1; MG/1
30 TABLET ORAL DAILY
Qty: 30 TABLET | Refills: 0 | Status: SHIPPED | OUTPATIENT
Start: 2020-07-08 | End: 2020-08-06

## 2020-07-08 RX ORDER — DEXTROAMPHETAMINE SACCHARATE, AMPHETAMINE ASPARTATE MONOHYDRATE, DEXTROAMPHETAMINE SULFATE AND AMPHETAMINE SULFATE 7.5; 7.5; 7.5; 7.5 MG/1; MG/1; MG/1; MG/1
30 CAPSULE, EXTENDED RELEASE ORAL DAILY
Qty: 30 CAPSULE | Refills: 0 | Status: SHIPPED | OUTPATIENT
Start: 2020-07-08 | End: 2020-08-06

## 2020-07-08 NOTE — TELEPHONE ENCOUNTER
Spoke to pt, advised rx requests were refilled and scheduled for med f/u 10/5/20 with [address]. Verbally understood with no further questions.

## 2020-08-05 RX ORDER — DEXTROAMPHETAMINE SACCHARATE, AMPHETAMINE ASPARTATE, DEXTROAMPHETAMINE SULFATE AND AMPHETAMINE SULFATE 7.5; 7.5; 7.5; 7.5 MG/1; MG/1; MG/1; MG/1
30 TABLET ORAL DAILY
Qty: 30 TABLET | Refills: 0 | Status: CANCELLED | OUTPATIENT
Start: 2020-08-05 | End: 2020-09-04

## 2020-08-05 RX ORDER — DEXTROAMPHETAMINE SACCHARATE, AMPHETAMINE ASPARTATE MONOHYDRATE, DEXTROAMPHETAMINE SULFATE AND AMPHETAMINE SULFATE 7.5; 7.5; 7.5; 7.5 MG/1; MG/1; MG/1; MG/1
30 CAPSULE, EXTENDED RELEASE ORAL DAILY
Qty: 30 CAPSULE | Refills: 0 | Status: CANCELLED | OUTPATIENT
Start: 2020-08-05 | End: 2020-09-04

## 2020-08-07 ENCOUNTER — TELEPHONE (OUTPATIENT)
Dept: FAMILY MEDICINE CLINIC | Facility: CLINIC | Age: 35
End: 2020-08-07

## 2020-08-07 RX ORDER — FLUTICASONE PROPIONATE 50 MCG
2 SPRAY, SUSPENSION (ML) NASAL DAILY
Qty: 3 BOTTLE | Refills: 3 | Status: SHIPPED | OUTPATIENT
Start: 2020-08-07 | End: 2021-04-19 | Stop reason: ALTCHOICE

## 2020-08-07 RX ORDER — DEXTROAMPHETAMINE SACCHARATE, AMPHETAMINE ASPARTATE MONOHYDRATE, DEXTROAMPHETAMINE SULFATE AND AMPHETAMINE SULFATE 7.5; 7.5; 7.5; 7.5 MG/1; MG/1; MG/1; MG/1
30 CAPSULE, EXTENDED RELEASE ORAL DAILY
Qty: 30 CAPSULE | Refills: 0 | Status: SHIPPED | OUTPATIENT
Start: 2020-08-07 | End: 2020-09-02

## 2020-08-07 RX ORDER — DEXTROAMPHETAMINE SACCHARATE, AMPHETAMINE ASPARTATE, DEXTROAMPHETAMINE SULFATE AND AMPHETAMINE SULFATE 7.5; 7.5; 7.5; 7.5 MG/1; MG/1; MG/1; MG/1
30 TABLET ORAL DAILY
Qty: 30 TABLET | Refills: 0 | Status: SHIPPED | OUTPATIENT
Start: 2020-08-07 | End: 2020-09-02

## 2020-08-07 RX ORDER — LEVOCETIRIZINE DIHYDROCHLORIDE 5 MG/1
5 TABLET, FILM COATED ORAL EVERY EVENING
Qty: 90 TABLET | Refills: 3 | Status: SHIPPED | OUTPATIENT
Start: 2020-08-07 | End: 2020-10-05

## 2020-08-07 NOTE — TELEPHONE ENCOUNTER
LRF  fluticason 8/16/19 #3 with 3  Xyzal 8/16/19 #90 with 3  adderall xr 7/8/2020  adderall 7/8/2020    LOV  4/1/2020    Future Appointments   Date Time Provider Kwame Guerra   10/5/2020  1:30 PM Aaron Knight MD [facility] Guille Brooks

## 2020-08-07 NOTE — TELEPHONE ENCOUNTER
Pt said he sent a [employer] message asking for refills. Pt calling to check on the status.

## 2020-08-07 NOTE — TELEPHONE ENCOUNTER
Fluticasone Propionate 50 MCG/ACT Nasal Suspension Glendy Avila MD]       Patient Comment: Hi, I realized Allergy season is only a week or two away I wanted to add these to the original request I sent yesterday thank you         Levocetirizine Dihydrochl

## 2020-08-07 NOTE — TELEPHONE ENCOUNTER
LRF  fluticason 8/16/19 #3 with 3  Xyzal 8/16/19 #90 with 3  adderall xr 7/8/2020  adderall 7/8/2020    LOV  4/1/2020      See refill request from 8/6/2020

## 2020-09-02 RX ORDER — DEXTROAMPHETAMINE SACCHARATE, AMPHETAMINE ASPARTATE, DEXTROAMPHETAMINE SULFATE AND AMPHETAMINE SULFATE 7.5; 7.5; 7.5; 7.5 MG/1; MG/1; MG/1; MG/1
30 TABLET ORAL DAILY
Qty: 30 TABLET | Refills: 0 | Status: SHIPPED | OUTPATIENT
Start: 2020-09-02 | End: 2020-09-29

## 2020-09-02 RX ORDER — DEXTROAMPHETAMINE SACCHARATE, AMPHETAMINE ASPARTATE MONOHYDRATE, DEXTROAMPHETAMINE SULFATE AND AMPHETAMINE SULFATE 7.5; 7.5; 7.5; 7.5 MG/1; MG/1; MG/1; MG/1
30 CAPSULE, EXTENDED RELEASE ORAL DAILY
Qty: 30 CAPSULE | Refills: 0 | Status: SHIPPED | OUTPATIENT
Start: 2020-09-02 | End: 2020-09-29

## 2020-09-02 NOTE — TELEPHONE ENCOUNTER
Protocol: none  Both last refilled 8/7/20 both #30 with 0 RF  DOX with [facility] 4/1/20  Future appt 10/5/20  Routed to PCP to advise

## 2020-09-29 RX ORDER — DEXTROAMPHETAMINE SACCHARATE, AMPHETAMINE ASPARTATE MONOHYDRATE, DEXTROAMPHETAMINE SULFATE AND AMPHETAMINE SULFATE 7.5; 7.5; 7.5; 7.5 MG/1; MG/1; MG/1; MG/1
30 CAPSULE, EXTENDED RELEASE ORAL DAILY
Qty: 30 CAPSULE | Refills: 0 | Status: SHIPPED | OUTPATIENT
Start: 2020-09-29 | End: 2020-10-26

## 2020-09-29 RX ORDER — DEXTROAMPHETAMINE SACCHARATE, AMPHETAMINE ASPARTATE, DEXTROAMPHETAMINE SULFATE AND AMPHETAMINE SULFATE 7.5; 7.5; 7.5; 7.5 MG/1; MG/1; MG/1; MG/1
30 TABLET ORAL DAILY
Qty: 30 TABLET | Refills: 0 | Status: SHIPPED | OUTPATIENT
Start: 2020-09-29 | End: 2020-10-26

## 2020-09-30 NOTE — TELEPHONE ENCOUNTER
Spoke to pt advising that [facility] refilled rx requests. Has an appt with [facility] 10/5/20. Verbally understood with no further questions.

## 2020-10-05 PROCEDURE — 80061 LIPID PANEL: CPT | Performed by: FAMILY MEDICINE

## 2020-10-05 PROCEDURE — 36415 COLL VENOUS BLD VENIPUNCTURE: CPT | Performed by: FAMILY MEDICINE

## 2020-10-05 PROCEDURE — 82306 VITAMIN D 25 HYDROXY: CPT | Performed by: FAMILY MEDICINE

## 2020-10-05 PROCEDURE — 86769 SARS-COV-2 COVID-19 ANTIBODY: CPT | Performed by: FAMILY MEDICINE

## 2020-10-05 PROCEDURE — 80050 GENERAL HEALTH PANEL: CPT | Performed by: FAMILY MEDICINE

## 2020-10-05 NOTE — PROGRESS NOTES
Robb Rojas is a 30year old male. HPI:   Pt is here to f/u on ADHD. Meds prescribed:   Current Outpatient Medications:   •  Amphetamine-Dextroamphet ER (ADDERALL XR) 30 MG Oral Capsule SR 24 Hr, Take 1 capsule (30 mg total) by mouth daily. , Disp: Yes      Types: Cannabis         REVIEW OF SYSTEMS:   GENERAL HEALTH: feels well otherwise  SKIN: denies any unusual skin lesions or rashes  RESPIRATORY: denies shortness of breath with exertion  CARDIOVASCULAR: denies chest pain on exertion  GI: denies ab Future    Vitamin D deficiency  -     VENIPUNCTURE  -     CBC WITH DIFFERENTIAL WITH PLATELET; Future  -     COMP METABOLIC PANEL (14); Future  -     LIPID PANEL;  Future  -     ASSAY, THYROID STIM HORMONE; Future  -     VITAMIN D, 25-HYDROXY; Future    Lip

## 2020-10-26 RX ORDER — DEXTROAMPHETAMINE SACCHARATE, AMPHETAMINE ASPARTATE, DEXTROAMPHETAMINE SULFATE AND AMPHETAMINE SULFATE 7.5; 7.5; 7.5; 7.5 MG/1; MG/1; MG/1; MG/1
30 TABLET ORAL DAILY
Qty: 30 TABLET | Refills: 0 | Status: SHIPPED | OUTPATIENT
Start: 2020-10-26 | End: 2020-11-20

## 2020-10-26 RX ORDER — DEXTROAMPHETAMINE SACCHARATE, AMPHETAMINE ASPARTATE MONOHYDRATE, DEXTROAMPHETAMINE SULFATE AND AMPHETAMINE SULFATE 7.5; 7.5; 7.5; 7.5 MG/1; MG/1; MG/1; MG/1
30 CAPSULE, EXTENDED RELEASE ORAL DAILY
Qty: 30 CAPSULE | Refills: 0 | Status: SHIPPED | OUTPATIENT
Start: 2020-10-26 | End: 2020-11-20

## 2020-10-26 NOTE — TELEPHONE ENCOUNTER
Routing to provider per protocol. Both strengths of Adderall last refilled on 9/29/20 for # 30 with 0 rf. Last seen on 10/5/20. No future appointments. Thank you.

## 2020-11-20 RX ORDER — DEXTROAMPHETAMINE SACCHARATE, AMPHETAMINE ASPARTATE, DEXTROAMPHETAMINE SULFATE AND AMPHETAMINE SULFATE 7.5; 7.5; 7.5; 7.5 MG/1; MG/1; MG/1; MG/1
30 TABLET ORAL DAILY
Qty: 30 TABLET | Refills: 0 | Status: SHIPPED | OUTPATIENT
Start: 2020-11-20 | End: 2020-12-16

## 2020-11-20 RX ORDER — DEXTROAMPHETAMINE SACCHARATE, AMPHETAMINE ASPARTATE MONOHYDRATE, DEXTROAMPHETAMINE SULFATE AND AMPHETAMINE SULFATE 7.5; 7.5; 7.5; 7.5 MG/1; MG/1; MG/1; MG/1
30 CAPSULE, EXTENDED RELEASE ORAL DAILY
Qty: 30 CAPSULE | Refills: 0 | Status: SHIPPED | OUTPATIENT
Start: 2020-11-20 | End: 2020-12-16

## 2020-12-17 RX ORDER — DEXTROAMPHETAMINE SACCHARATE, AMPHETAMINE ASPARTATE, DEXTROAMPHETAMINE SULFATE AND AMPHETAMINE SULFATE 7.5; 7.5; 7.5; 7.5 MG/1; MG/1; MG/1; MG/1
30 TABLET ORAL DAILY
Qty: 30 TABLET | Refills: 0 | Status: SHIPPED | OUTPATIENT
Start: 2020-12-17 | End: 2021-01-12

## 2020-12-17 RX ORDER — DEXTROAMPHETAMINE SACCHARATE, AMPHETAMINE ASPARTATE MONOHYDRATE, DEXTROAMPHETAMINE SULFATE AND AMPHETAMINE SULFATE 7.5; 7.5; 7.5; 7.5 MG/1; MG/1; MG/1; MG/1
30 CAPSULE, EXTENDED RELEASE ORAL DAILY
Qty: 30 CAPSULE | Refills: 0 | Status: SHIPPED | OUTPATIENT
Start: 2020-12-17 | End: 2021-01-12

## 2021-01-12 NOTE — TELEPHONE ENCOUNTER
LOV: 10/5/20   Last Refill: Adderall 30 XR 12/17/20  #30 0 RF    No future appointments.     Adderall 30  12/17/20 #30 0 RF

## 2021-01-13 RX ORDER — DEXTROAMPHETAMINE SACCHARATE, AMPHETAMINE ASPARTATE, DEXTROAMPHETAMINE SULFATE AND AMPHETAMINE SULFATE 7.5; 7.5; 7.5; 7.5 MG/1; MG/1; MG/1; MG/1
30 TABLET ORAL DAILY
Qty: 30 TABLET | Refills: 0 | Status: SHIPPED | OUTPATIENT
Start: 2021-01-13 | End: 2021-02-08

## 2021-01-13 RX ORDER — DEXTROAMPHETAMINE SACCHARATE, AMPHETAMINE ASPARTATE MONOHYDRATE, DEXTROAMPHETAMINE SULFATE AND AMPHETAMINE SULFATE 7.5; 7.5; 7.5; 7.5 MG/1; MG/1; MG/1; MG/1
30 CAPSULE, EXTENDED RELEASE ORAL DAILY
Qty: 30 CAPSULE | Refills: 0 | Status: SHIPPED | OUTPATIENT
Start: 2021-01-13 | End: 2021-02-08

## 2021-02-08 RX ORDER — DEXTROAMPHETAMINE SACCHARATE, AMPHETAMINE ASPARTATE, DEXTROAMPHETAMINE SULFATE AND AMPHETAMINE SULFATE 7.5; 7.5; 7.5; 7.5 MG/1; MG/1; MG/1; MG/1
30 TABLET ORAL DAILY
Qty: 30 TABLET | Refills: 0 | Status: SHIPPED | OUTPATIENT
Start: 2021-02-08 | End: 2021-03-04

## 2021-02-08 RX ORDER — DEXTROAMPHETAMINE SACCHARATE, AMPHETAMINE ASPARTATE MONOHYDRATE, DEXTROAMPHETAMINE SULFATE AND AMPHETAMINE SULFATE 7.5; 7.5; 7.5; 7.5 MG/1; MG/1; MG/1; MG/1
30 CAPSULE, EXTENDED RELEASE ORAL DAILY
Qty: 30 CAPSULE | Refills: 0 | Status: SHIPPED | OUTPATIENT
Start: 2021-02-08 | End: 2021-03-04

## 2021-02-08 NOTE — TELEPHONE ENCOUNTER
Protocol: none  Both last refilled 1/13/21 #30 with 0 RF  LOV with [facility] 10/5/20 for ADHD  No future appt  Routed to PCP to advise

## 2021-03-05 RX ORDER — DEXTROAMPHETAMINE SACCHARATE, AMPHETAMINE ASPARTATE MONOHYDRATE, DEXTROAMPHETAMINE SULFATE AND AMPHETAMINE SULFATE 7.5; 7.5; 7.5; 7.5 MG/1; MG/1; MG/1; MG/1
30 CAPSULE, EXTENDED RELEASE ORAL DAILY
Qty: 30 CAPSULE | Refills: 0 | Status: SHIPPED | OUTPATIENT
Start: 2021-03-05 | End: 2021-03-30

## 2021-03-05 RX ORDER — DEXTROAMPHETAMINE SACCHARATE, AMPHETAMINE ASPARTATE, DEXTROAMPHETAMINE SULFATE AND AMPHETAMINE SULFATE 7.5; 7.5; 7.5; 7.5 MG/1; MG/1; MG/1; MG/1
30 TABLET ORAL DAILY
Qty: 30 TABLET | Refills: 0 | Status: SHIPPED | OUTPATIENT
Start: 2021-03-05 | End: 2021-03-30

## 2021-03-31 RX ORDER — DEXTROAMPHETAMINE SACCHARATE, AMPHETAMINE ASPARTATE, DEXTROAMPHETAMINE SULFATE AND AMPHETAMINE SULFATE 7.5; 7.5; 7.5; 7.5 MG/1; MG/1; MG/1; MG/1
30 TABLET ORAL DAILY
Qty: 30 TABLET | Refills: 0 | Status: SHIPPED | OUTPATIENT
Start: 2021-03-31 | End: 2021-04-27

## 2021-03-31 RX ORDER — DEXTROAMPHETAMINE SACCHARATE, AMPHETAMINE ASPARTATE MONOHYDRATE, DEXTROAMPHETAMINE SULFATE AND AMPHETAMINE SULFATE 7.5; 7.5; 7.5; 7.5 MG/1; MG/1; MG/1; MG/1
30 CAPSULE, EXTENDED RELEASE ORAL DAILY
Qty: 30 CAPSULE | Refills: 0 | Status: SHIPPED | OUTPATIENT
Start: 2021-03-31 | End: 2021-04-27

## 2021-04-19 NOTE — PROGRESS NOTES
Yareli Butler is a 30year old male.   HPI:   Virtual Video/Telephone Check-In    Yareli Butler verbally consents to a epic video visit on 4/19/21    Patient understands and accepts financial responsibility for any deductible, co-insurance and/or co-pay remission   • Diabetes Paternal Grandmother    • Cancer Sister         precancer of cervix, treated   • Cancer Other         Grandfather  - Colon      Social History    Tobacco Use      Smoking status: Never Smoker      Smokeless tobacco: Never Used    Alc

## 2021-04-27 RX ORDER — DEXTROAMPHETAMINE SACCHARATE, AMPHETAMINE ASPARTATE MONOHYDRATE, DEXTROAMPHETAMINE SULFATE AND AMPHETAMINE SULFATE 7.5; 7.5; 7.5; 7.5 MG/1; MG/1; MG/1; MG/1
30 CAPSULE, EXTENDED RELEASE ORAL DAILY
Qty: 30 CAPSULE | Refills: 0 | Status: SHIPPED | OUTPATIENT
Start: 2021-04-27 | End: 2021-05-21

## 2021-04-27 RX ORDER — DEXTROAMPHETAMINE SACCHARATE, AMPHETAMINE ASPARTATE, DEXTROAMPHETAMINE SULFATE AND AMPHETAMINE SULFATE 7.5; 7.5; 7.5; 7.5 MG/1; MG/1; MG/1; MG/1
30 TABLET ORAL DAILY
Qty: 30 TABLET | Refills: 0 | Status: SHIPPED | OUTPATIENT
Start: 2021-04-27 | End: 2021-05-21

## 2021-05-21 RX ORDER — DEXTROAMPHETAMINE SACCHARATE, AMPHETAMINE ASPARTATE MONOHYDRATE, DEXTROAMPHETAMINE SULFATE AND AMPHETAMINE SULFATE 7.5; 7.5; 7.5; 7.5 MG/1; MG/1; MG/1; MG/1
30 CAPSULE, EXTENDED RELEASE ORAL DAILY
Qty: 30 CAPSULE | Refills: 0 | Status: SHIPPED | OUTPATIENT
Start: 2021-05-21 | End: 2021-06-17

## 2021-05-21 RX ORDER — DEXTROAMPHETAMINE SACCHARATE, AMPHETAMINE ASPARTATE, DEXTROAMPHETAMINE SULFATE AND AMPHETAMINE SULFATE 7.5; 7.5; 7.5; 7.5 MG/1; MG/1; MG/1; MG/1
30 TABLET ORAL DAILY
Qty: 30 TABLET | Refills: 0 | Status: SHIPPED | OUTPATIENT
Start: 2021-05-21 | End: 2021-06-17

## 2021-05-21 NOTE — TELEPHONE ENCOUNTER
Protocol: none  Both last refilled 4/27/21 for #30  Telemed visit with [facility] 4/19/21 for ADD  No future appt  Routed to PCP to advise

## 2021-06-17 NOTE — TELEPHONE ENCOUNTER
LOV: 4/19/21 Telemed  Last Refill: Adderall 5/21/21 #30 0 RF  Adderall XR 5/21/21 #30 0 RF    No future appointments.

## 2021-06-18 RX ORDER — DEXTROAMPHETAMINE SACCHARATE, AMPHETAMINE ASPARTATE, DEXTROAMPHETAMINE SULFATE AND AMPHETAMINE SULFATE 7.5; 7.5; 7.5; 7.5 MG/1; MG/1; MG/1; MG/1
30 TABLET ORAL DAILY
Qty: 30 TABLET | Refills: 0 | Status: SHIPPED | OUTPATIENT
Start: 2021-06-18 | End: 2021-07-16

## 2021-06-18 RX ORDER — DEXTROAMPHETAMINE SACCHARATE, AMPHETAMINE ASPARTATE MONOHYDRATE, DEXTROAMPHETAMINE SULFATE AND AMPHETAMINE SULFATE 7.5; 7.5; 7.5; 7.5 MG/1; MG/1; MG/1; MG/1
30 CAPSULE, EXTENDED RELEASE ORAL DAILY
Qty: 30 CAPSULE | Refills: 0 | Status: SHIPPED | OUTPATIENT
Start: 2021-06-18 | End: 2021-07-16

## 2021-07-16 RX ORDER — DEXTROAMPHETAMINE SACCHARATE, AMPHETAMINE ASPARTATE MONOHYDRATE, DEXTROAMPHETAMINE SULFATE AND AMPHETAMINE SULFATE 7.5; 7.5; 7.5; 7.5 MG/1; MG/1; MG/1; MG/1
30 CAPSULE, EXTENDED RELEASE ORAL DAILY
Qty: 30 CAPSULE | Refills: 0 | Status: SHIPPED | OUTPATIENT
Start: 2021-07-16 | End: 2021-08-12

## 2021-07-16 RX ORDER — DEXTROAMPHETAMINE SACCHARATE, AMPHETAMINE ASPARTATE, DEXTROAMPHETAMINE SULFATE AND AMPHETAMINE SULFATE 7.5; 7.5; 7.5; 7.5 MG/1; MG/1; MG/1; MG/1
30 TABLET ORAL DAILY
Qty: 30 TABLET | Refills: 0 | Status: SHIPPED | OUTPATIENT
Start: 2021-07-16 | End: 2021-08-12

## 2021-08-12 ENCOUNTER — TELEPHONE (OUTPATIENT)
Dept: FAMILY MEDICINE CLINIC | Facility: CLINIC | Age: 36
End: 2021-08-12

## 2021-08-12 NOTE — TELEPHONE ENCOUNTER
FLONASE ALLERGY RELIEF NA   Hello     I am trying to get the nose spray I have had in the past for hay fever, I think it’s Flonase but I am not 100%! I can’t find a list of medications I’ve had in the past to check the box.      Let me know if you need m

## 2021-08-12 NOTE — TELEPHONE ENCOUNTER
LOV: 4/19/21   Last Refill:    adderall 30 7/16/21  #30 0 RF  adderall 30 ER  7/16/21  #30 0 RF    No future appointments.

## 2021-08-13 RX ORDER — FLUTICASONE PROPIONATE 50 MCG
2 SPRAY, SUSPENSION (ML) NASAL DAILY
Qty: 3 EACH | Refills: 1 | Status: SHIPPED | OUTPATIENT
Start: 2021-08-13 | End: 2022-08-08

## 2021-08-15 RX ORDER — DEXTROAMPHETAMINE SACCHARATE, AMPHETAMINE ASPARTATE, DEXTROAMPHETAMINE SULFATE AND AMPHETAMINE SULFATE 7.5; 7.5; 7.5; 7.5 MG/1; MG/1; MG/1; MG/1
30 TABLET ORAL DAILY
Qty: 30 TABLET | Refills: 0 | Status: SHIPPED | OUTPATIENT
Start: 2021-08-15 | End: 2021-09-09

## 2021-08-15 RX ORDER — DEXTROAMPHETAMINE SACCHARATE, AMPHETAMINE ASPARTATE MONOHYDRATE, DEXTROAMPHETAMINE SULFATE AND AMPHETAMINE SULFATE 7.5; 7.5; 7.5; 7.5 MG/1; MG/1; MG/1; MG/1
30 CAPSULE, EXTENDED RELEASE ORAL DAILY
Qty: 30 CAPSULE | Refills: 0 | Status: SHIPPED | OUTPATIENT
Start: 2021-08-15 | End: 2021-09-09

## 2021-09-09 RX ORDER — DEXTROAMPHETAMINE SACCHARATE, AMPHETAMINE ASPARTATE, DEXTROAMPHETAMINE SULFATE AND AMPHETAMINE SULFATE 7.5; 7.5; 7.5; 7.5 MG/1; MG/1; MG/1; MG/1
30 TABLET ORAL DAILY
Qty: 30 TABLET | Refills: 0 | Status: SHIPPED | OUTPATIENT
Start: 2021-09-09 | End: 2021-10-07

## 2021-09-09 RX ORDER — DEXTROAMPHETAMINE SACCHARATE, AMPHETAMINE ASPARTATE MONOHYDRATE, DEXTROAMPHETAMINE SULFATE AND AMPHETAMINE SULFATE 7.5; 7.5; 7.5; 7.5 MG/1; MG/1; MG/1; MG/1
30 CAPSULE, EXTENDED RELEASE ORAL DAILY
Qty: 30 CAPSULE | Refills: 0 | Status: SHIPPED | OUTPATIENT
Start: 2021-09-09 | End: 2021-10-07

## 2021-09-09 NOTE — TELEPHONE ENCOUNTER
Both meds last refilled 8/15/21 for #30 with 0 RF  LV with [address]- telemed 4/19/21  No future appt  Routed to PCP to advise

## 2021-10-06 NOTE — TELEPHONE ENCOUNTER
Pt calling for prescription refill on     Amphetamine-Dextroamphet ER (ADDERALL XR) 30 MG Oral Capsule SR 24 Hr    amphetamine-dextroamphetamine (ADDERALL) 30 MG Oral Tab    Pt verified pharmacy in     [facility] [address] [place] [address]

## 2021-10-06 NOTE — TELEPHONE ENCOUNTER
LOV Telemedicine 04/19/2021 w/ Dr. Marium Bruno    Last refill on 09/09/2021, for #30 caps, with 0 refills  Amphetamine-Dextroamphet ER (ADDERALL XR) 30 MG Oral Capsule SR 24 Hr    Last refill on 09/06/2021, for #30 tabs, with 0 refills  amphetamine-dextroamphetam

## 2021-10-07 RX ORDER — DEXTROAMPHETAMINE SACCHARATE, AMPHETAMINE ASPARTATE, DEXTROAMPHETAMINE SULFATE AND AMPHETAMINE SULFATE 7.5; 7.5; 7.5; 7.5 MG/1; MG/1; MG/1; MG/1
30 TABLET ORAL DAILY
Qty: 30 TABLET | Refills: 0 | Status: SHIPPED | OUTPATIENT
Start: 2021-10-07 | End: 2021-10-18

## 2021-10-07 RX ORDER — DEXTROAMPHETAMINE SACCHARATE, AMPHETAMINE ASPARTATE MONOHYDRATE, DEXTROAMPHETAMINE SULFATE AND AMPHETAMINE SULFATE 7.5; 7.5; 7.5; 7.5 MG/1; MG/1; MG/1; MG/1
30 CAPSULE, EXTENDED RELEASE ORAL DAILY
Qty: 30 CAPSULE | Refills: 0 | Status: SHIPPED | OUTPATIENT
Start: 2021-10-07 | End: 2021-11-02

## 2021-10-18 ENCOUNTER — OFFICE VISIT (OUTPATIENT)
Dept: FAMILY MEDICINE CLINIC | Facility: CLINIC | Age: 36
End: 2021-10-18
Payer: COMMERCIAL

## 2021-10-18 VITALS
HEIGHT: 69 IN | BODY MASS INDEX: 23.25 KG/M2 | TEMPERATURE: 98 F | WEIGHT: 157 LBS | HEART RATE: 84 BPM | SYSTOLIC BLOOD PRESSURE: 122 MMHG | RESPIRATION RATE: 16 BRPM | OXYGEN SATURATION: 98 % | DIASTOLIC BLOOD PRESSURE: 80 MMHG

## 2021-10-18 DIAGNOSIS — F90.2 ATTENTION DEFICIT HYPERACTIVITY DISORDER (ADHD), COMBINED TYPE: ICD-10-CM

## 2021-10-18 DIAGNOSIS — Z13.0 SCREENING FOR DEFICIENCY ANEMIA: ICD-10-CM

## 2021-10-18 DIAGNOSIS — Z13.220 SCREENING FOR LIPID DISORDERS: ICD-10-CM

## 2021-10-18 DIAGNOSIS — Z13.29 SCREENING FOR THYROID DISORDER: ICD-10-CM

## 2021-10-18 DIAGNOSIS — Z51.81 MEDICATION MONITORING ENCOUNTER: ICD-10-CM

## 2021-10-18 DIAGNOSIS — Z23 NEED FOR INFLUENZA VACCINATION: ICD-10-CM

## 2021-10-18 DIAGNOSIS — Z13.1 SCREENING FOR DIABETES MELLITUS: ICD-10-CM

## 2021-10-18 DIAGNOSIS — E55.9 VITAMIN D DEFICIENCY: Primary | ICD-10-CM

## 2021-10-18 PROCEDURE — 3074F SYST BP LT 130 MM HG: CPT | Performed by: FAMILY MEDICINE

## 2021-10-18 PROCEDURE — 90471 IMMUNIZATION ADMIN: CPT | Performed by: FAMILY MEDICINE

## 2021-10-18 PROCEDURE — 99214 OFFICE O/P EST MOD 30 MIN: CPT | Performed by: FAMILY MEDICINE

## 2021-10-18 PROCEDURE — 3079F DIAST BP 80-89 MM HG: CPT | Performed by: FAMILY MEDICINE

## 2021-10-18 PROCEDURE — 90686 IIV4 VACC NO PRSV 0.5 ML IM: CPT | Performed by: FAMILY MEDICINE

## 2021-10-18 PROCEDURE — 3008F BODY MASS INDEX DOCD: CPT | Performed by: FAMILY MEDICINE

## 2021-10-18 NOTE — PROGRESS NOTES
[facility] Office Note  Chief Complaint:   Patient presents with:  Medication Follow-Up: Adderall 30mg  Establish Care      HPI:   This is a 29year old male coming in for follow up on ADHD     Started on medications in 2012 - sta (36.6 °C) (Temporal)   Resp 16   Ht 5' 9\" (1.753 m)   Wt 157 lb (71.2 kg)   SpO2 98%   BMI 23.18 kg/m²  Estimated body mass index is 23.18 kg/m² as calculated from the following:    Height as of this encounter: 5' 9\" (1.753 m).     Weight as of this encou tests and medical decision making.   Appropriate medical decision-making and tests are ordered as detailed in the plan of care above        Meds & Refills for this Visit:  Requested Prescriptions      No prescriptions requested or ordered in this encounter

## 2021-11-02 DIAGNOSIS — F90.2 ATTENTION DEFICIT HYPERACTIVITY DISORDER (ADHD), COMBINED TYPE: ICD-10-CM

## 2021-11-02 RX ORDER — DEXTROAMPHETAMINE SACCHARATE, AMPHETAMINE ASPARTATE, DEXTROAMPHETAMINE SULFATE AND AMPHETAMINE SULFATE 7.5; 7.5; 7.5; 7.5 MG/1; MG/1; MG/1; MG/1
30 TABLET ORAL DAILY
Qty: 30 TABLET | Refills: 0 | Status: SHIPPED | OUTPATIENT
Start: 2021-11-08 | End: 2021-12-01

## 2021-11-02 RX ORDER — DEXTROAMPHETAMINE SACCHARATE, AMPHETAMINE ASPARTATE MONOHYDRATE, DEXTROAMPHETAMINE SULFATE AND AMPHETAMINE SULFATE 7.5; 7.5; 7.5; 7.5 MG/1; MG/1; MG/1; MG/1
30 CAPSULE, EXTENDED RELEASE ORAL DAILY
Qty: 30 CAPSULE | Refills: 0 | Status: SHIPPED | OUTPATIENT
Start: 2021-11-02 | End: 2021-11-30

## 2021-11-02 NOTE — TELEPHONE ENCOUNTER
PT CALLED AND ADV THAT SCRIPTS WERE SENT TO THE WRONG PHARMACY    PT NEEDS REFILLS OF     amphetamine-dextroamphetamine (ADDERALL) 30 MG Oral Tab    AND    Amphetamine-Dextroamphet ER (ADDERALL XR) 30 MG Oral Capsule SR 24 Hr    PLEASE SEND TO [facility]

## 2021-11-03 ENCOUNTER — TELEPHONE (OUTPATIENT)
Dept: FAMILY MEDICINE CLINIC | Facility: CLINIC | Age: 36
End: 2021-11-03

## 2021-11-03 NOTE — TELEPHONE ENCOUNTER
Please refer to Telephone encounters 11/3/2021    Adderall XR sent yesterday  Adderall IR future dated for 11/8    Pt reports that pharmacy stated that both Adderall XR and IR both on same \"hold date\"    Pt advised will call pharmacy to okay early fill f

## 2021-11-03 NOTE — TELEPHONE ENCOUNTER
[facility] #[id number] Chand[place] Marie, [address] AT [facility] [address], [phone], [phone]    amphetamine-dextroamphetamine (ADDERALL) 30 MG Oral Tab    Script was sent 11/2/21 but Pt needs new note for pickup date.  He will be out of med

## 2021-11-03 NOTE — TELEPHONE ENCOUNTER
Neda Berger wants to know about the message he sent earlier      Hi,     I spoke with the pharmacist today who said they have a note from the doctor saying they cannot fill this prescription until 11/8.  I will be out of medication tomorrow, as the last refill aylin

## 2021-11-05 ENCOUNTER — NURSE ONLY (OUTPATIENT)
Dept: FAMILY MEDICINE CLINIC | Facility: CLINIC | Age: 36
End: 2021-11-05
Payer: COMMERCIAL

## 2021-11-05 DIAGNOSIS — E55.9 VITAMIN D DEFICIENCY: ICD-10-CM

## 2021-11-05 DIAGNOSIS — Z13.29 SCREENING FOR THYROID DISORDER: ICD-10-CM

## 2021-11-05 DIAGNOSIS — Z13.1 SCREENING FOR DIABETES MELLITUS: ICD-10-CM

## 2021-11-05 DIAGNOSIS — Z13.0 SCREENING FOR DEFICIENCY ANEMIA: ICD-10-CM

## 2021-11-05 DIAGNOSIS — Z13.220 SCREENING FOR LIPID DISORDERS: ICD-10-CM

## 2021-11-05 PROCEDURE — 80050 GENERAL HEALTH PANEL: CPT | Performed by: FAMILY MEDICINE

## 2021-11-05 PROCEDURE — 82306 VITAMIN D 25 HYDROXY: CPT | Performed by: FAMILY MEDICINE

## 2021-11-05 PROCEDURE — 80061 LIPID PANEL: CPT | Performed by: FAMILY MEDICINE

## 2021-11-05 NOTE — PROGRESS NOTES
Patient presented for lab work ordered by Chelo Cross. One gold, one mint and one lavender tubes drawn with a butterfly needle in left ac space x one attempt. Pt tolerated procedure well.

## 2021-11-30 DIAGNOSIS — F90.2 ATTENTION DEFICIT HYPERACTIVITY DISORDER (ADHD), COMBINED TYPE: ICD-10-CM

## 2021-11-30 RX ORDER — DEXTROAMPHETAMINE SACCHARATE, AMPHETAMINE ASPARTATE, DEXTROAMPHETAMINE SULFATE AND AMPHETAMINE SULFATE 7.5; 7.5; 7.5; 7.5 MG/1; MG/1; MG/1; MG/1
30 TABLET ORAL DAILY
Qty: 30 TABLET | Refills: 0 | OUTPATIENT
Start: 2021-11-30 | End: 2021-12-30

## 2021-11-30 RX ORDER — DEXTROAMPHETAMINE SACCHARATE, AMPHETAMINE ASPARTATE MONOHYDRATE, DEXTROAMPHETAMINE SULFATE AND AMPHETAMINE SULFATE 7.5; 7.5; 7.5; 7.5 MG/1; MG/1; MG/1; MG/1
30 CAPSULE, EXTENDED RELEASE ORAL DAILY
Qty: 30 CAPSULE | Refills: 0 | Status: SHIPPED | OUTPATIENT
Start: 2021-11-30 | End: 2021-12-27

## 2021-11-30 NOTE — TELEPHONE ENCOUNTER
Last refilled 11/2/21 for #30  Other  30 mg was refilled 11/8/21 please see note to pharmacy. Pts request is too early.  Medication denied  LOV with [facility] 10/18/21  No future appt

## 2021-12-01 RX ORDER — DEXTROAMPHETAMINE SACCHARATE, AMPHETAMINE ASPARTATE, DEXTROAMPHETAMINE SULFATE AND AMPHETAMINE SULFATE 7.5; 7.5; 7.5; 7.5 MG/1; MG/1; MG/1; MG/1
30 TABLET ORAL DAILY
Qty: 30 TABLET | Refills: 0 | Status: SHIPPED | OUTPATIENT
Start: 2021-12-01 | End: 2021-12-27

## 2021-12-01 NOTE — TELEPHONE ENCOUNTER
Pt called - inquiring about second prescription - only received one  Pt only received the extended release, needs the adderall as needed tablets     Pt advised will send other prescription.   Please review, thank you

## 2021-12-13 ENCOUNTER — MED REC SCAN ONLY (OUTPATIENT)
Dept: FAMILY MEDICINE CLINIC | Facility: CLINIC | Age: 36
End: 2021-12-13

## 2021-12-27 DIAGNOSIS — F90.2 ATTENTION DEFICIT HYPERACTIVITY DISORDER (ADHD), COMBINED TYPE: ICD-10-CM

## 2021-12-27 NOTE — TELEPHONE ENCOUNTER
No refill protocol for this medication. Adderall 30mg:  Last refill: 12- #30 with 0 refills    Adderall XR 30mg:  Last refill: 11- #30 with 0 refills    Last Visit: 10-  Next Visit: No future appointments.       Forward to Timmy Garcia

## 2021-12-28 ENCOUNTER — TELEPHONE (OUTPATIENT)
Dept: FAMILY MEDICINE CLINIC | Facility: CLINIC | Age: 36
End: 2021-12-28

## 2021-12-28 RX ORDER — DEXTROAMPHETAMINE SACCHARATE, AMPHETAMINE ASPARTATE, DEXTROAMPHETAMINE SULFATE AND AMPHETAMINE SULFATE 7.5; 7.5; 7.5; 7.5 MG/1; MG/1; MG/1; MG/1
30 TABLET ORAL DAILY
Qty: 30 TABLET | Refills: 0 | Status: SHIPPED | OUTPATIENT
Start: 2021-12-28 | End: 2021-12-29

## 2021-12-28 RX ORDER — DEXTROAMPHETAMINE SACCHARATE, AMPHETAMINE ASPARTATE MONOHYDRATE, DEXTROAMPHETAMINE SULFATE AND AMPHETAMINE SULFATE 7.5; 7.5; 7.5; 7.5 MG/1; MG/1; MG/1; MG/1
30 CAPSULE, EXTENDED RELEASE ORAL DAILY
Qty: 30 CAPSULE | Refills: 0 | Status: SHIPPED | OUTPATIENT
Start: 2021-12-28 | End: 2022-01-24

## 2021-12-28 NOTE — TELEPHONE ENCOUNTER
PT CALLED AND ADV THAT HE IS HAVING SOME PROBLEMS WITH GET ONE OF HIS MEDS     amphetamine-dextroamphetamine (ADDERALL) 30 MG Oral Tab    PT ADV THAT THE ADDERALL 30 MG XR IS OK BUT HAVING ISSUES WITH THE OTHER.     PT WOULD LIKE TO KEEP PICKING UP BOTH MED

## 2021-12-28 NOTE — TELEPHONE ENCOUNTER
Is it ok to advise pharmacy to refill the Adderall 30mg tablets at the same time at the Adderall XR? Patient take the Adderall 30 PRN but would like to  prescriptions at the same time.

## 2021-12-29 ENCOUNTER — TELEPHONE (OUTPATIENT)
Dept: FAMILY MEDICINE CLINIC | Facility: CLINIC | Age: 36
End: 2021-12-29

## 2021-12-29 DIAGNOSIS — F90.2 ATTENTION DEFICIT HYPERACTIVITY DISORDER (ADHD), COMBINED TYPE: ICD-10-CM

## 2021-12-29 RX ORDER — DEXTROAMPHETAMINE SACCHARATE, AMPHETAMINE ASPARTATE, DEXTROAMPHETAMINE SULFATE AND AMPHETAMINE SULFATE 7.5; 7.5; 7.5; 7.5 MG/1; MG/1; MG/1; MG/1
30 TABLET ORAL DAILY
Qty: 30 TABLET | Refills: 0 | Status: SHIPPED | OUTPATIENT
Start: 2021-12-29 | End: 2021-12-29

## 2021-12-29 RX ORDER — DEXTROAMPHETAMINE SACCHARATE, AMPHETAMINE ASPARTATE, DEXTROAMPHETAMINE SULFATE AND AMPHETAMINE SULFATE 7.5; 7.5; 7.5; 7.5 MG/1; MG/1; MG/1; MG/1
30 TABLET ORAL DAILY
Qty: 30 TABLET | Refills: 0 | Status: SHIPPED | OUTPATIENT
Start: 2021-12-29 | End: 2022-01-24

## 2021-12-29 NOTE — TELEPHONE ENCOUNTER
PT CALLED AND ADV THAT SCRIPT WAS SENT TO WRONG PHARMACY.     PLEASE SEND TO [facility]    amphetamine-dextroamphetamine (ADDERALL) 30 MG Oral Tab    THANK YOU

## 2022-01-24 DIAGNOSIS — F90.2 ATTENTION DEFICIT HYPERACTIVITY DISORDER (ADHD), COMBINED TYPE: ICD-10-CM

## 2022-01-24 RX ORDER — DEXTROAMPHETAMINE SACCHARATE, AMPHETAMINE ASPARTATE, DEXTROAMPHETAMINE SULFATE AND AMPHETAMINE SULFATE 7.5; 7.5; 7.5; 7.5 MG/1; MG/1; MG/1; MG/1
30 TABLET ORAL DAILY
Qty: 30 TABLET | Refills: 0 | Status: SHIPPED | OUTPATIENT
Start: 2022-01-24 | End: 2022-02-23

## 2022-01-24 RX ORDER — DEXTROAMPHETAMINE SACCHARATE, AMPHETAMINE ASPARTATE MONOHYDRATE, DEXTROAMPHETAMINE SULFATE AND AMPHETAMINE SULFATE 7.5; 7.5; 7.5; 7.5 MG/1; MG/1; MG/1; MG/1
30 CAPSULE, EXTENDED RELEASE ORAL DAILY
Qty: 30 CAPSULE | Refills: 0 | Status: SHIPPED | OUTPATIENT
Start: 2022-01-24 | End: 2022-02-23

## 2022-01-24 NOTE — TELEPHONE ENCOUNTER
No refill protocol for this medication. Adderall 30mg:  Last refill: 12- #30 with 0 refills    Adderall XR 30mg   Last refill: 12- #30 with 0 refills    Last Visit: 10-   Next Visit: No future appointments.       Forward to Dr. Thao Roque

## 2022-02-18 RX ORDER — DEXTROAMPHETAMINE SACCHARATE, AMPHETAMINE ASPARTATE, DEXTROAMPHETAMINE SULFATE AND AMPHETAMINE SULFATE 7.5; 7.5; 7.5; 7.5 MG/1; MG/1; MG/1; MG/1
30 TABLET ORAL DAILY
Qty: 30 TABLET | Refills: 0 | Status: SHIPPED | OUTPATIENT
Start: 2022-02-18 | End: 2022-03-20

## 2022-02-18 RX ORDER — DEXTROAMPHETAMINE SACCHARATE, AMPHETAMINE ASPARTATE MONOHYDRATE, DEXTROAMPHETAMINE SULFATE AND AMPHETAMINE SULFATE 7.5; 7.5; 7.5; 7.5 MG/1; MG/1; MG/1; MG/1
30 CAPSULE, EXTENDED RELEASE ORAL DAILY
Qty: 30 CAPSULE | Refills: 0 | Status: SHIPPED | OUTPATIENT
Start: 2022-02-18 | End: 2022-03-20

## 2022-02-18 NOTE — TELEPHONE ENCOUNTER
Please have him schedule visit / in-person or video visit. Educate patient on every 3-4 months visits to get medications appropriately refilled. I will not be able to refill his medication if I do not see him. Thank you.

## 2022-02-18 NOTE — TELEPHONE ENCOUNTER
No refill protocol for this medication. Adderall 30 XR   Last refill: 1/24/2022 #30 with 0 refills    Adderall 30mg:  Last refill: 1/24/2022 #30 with 0 refills    Last Visit: 10/18/2021  Next Visit: No future appointments. Forward to Dr. Sol Esquivel please advise on refills. Thanks.

## 2022-02-28 ENCOUNTER — MED REC SCAN ONLY (OUTPATIENT)
Dept: FAMILY MEDICINE CLINIC | Facility: CLINIC | Age: 37
End: 2022-02-28

## 2022-03-16 ENCOUNTER — TELEMEDICINE (OUTPATIENT)
Dept: FAMILY MEDICINE CLINIC | Facility: CLINIC | Age: 37
End: 2022-03-16
Payer: COMMERCIAL

## 2022-03-16 DIAGNOSIS — F90.2 ATTENTION DEFICIT HYPERACTIVITY DISORDER (ADHD), COMBINED TYPE: ICD-10-CM

## 2022-03-16 DIAGNOSIS — Z51.81 MEDICATION MONITORING ENCOUNTER: Primary | ICD-10-CM

## 2022-03-16 PROCEDURE — 99213 OFFICE O/P EST LOW 20 MIN: CPT | Performed by: FAMILY MEDICINE

## 2022-03-16 RX ORDER — DEXTROAMPHETAMINE SACCHARATE, AMPHETAMINE ASPARTATE MONOHYDRATE, DEXTROAMPHETAMINE SULFATE AND AMPHETAMINE SULFATE 7.5; 7.5; 7.5; 7.5 MG/1; MG/1; MG/1; MG/1
30 CAPSULE, EXTENDED RELEASE ORAL DAILY
Qty: 30 CAPSULE | Refills: 0 | Status: SHIPPED | OUTPATIENT
Start: 2022-03-20 | End: 2022-04-19

## 2022-03-16 RX ORDER — DEXTROAMPHETAMINE SACCHARATE, AMPHETAMINE ASPARTATE MONOHYDRATE, DEXTROAMPHETAMINE SULFATE AND AMPHETAMINE SULFATE 7.5; 7.5; 7.5; 7.5 MG/1; MG/1; MG/1; MG/1
30 CAPSULE, EXTENDED RELEASE ORAL DAILY
Qty: 30 CAPSULE | Refills: 0 | Status: SHIPPED | OUTPATIENT
Start: 2022-05-20 | End: 2022-06-19

## 2022-03-16 RX ORDER — DEXTROAMPHETAMINE SACCHARATE, AMPHETAMINE ASPARTATE, DEXTROAMPHETAMINE SULFATE AND AMPHETAMINE SULFATE 7.5; 7.5; 7.5; 7.5 MG/1; MG/1; MG/1; MG/1
30 TABLET ORAL DAILY
Qty: 30 TABLET | Refills: 0 | Status: SHIPPED | OUTPATIENT
Start: 2022-04-20 | End: 2022-05-20

## 2022-03-16 RX ORDER — DEXTROAMPHETAMINE SACCHARATE, AMPHETAMINE ASPARTATE MONOHYDRATE, DEXTROAMPHETAMINE SULFATE AND AMPHETAMINE SULFATE 7.5; 7.5; 7.5; 7.5 MG/1; MG/1; MG/1; MG/1
30 CAPSULE, EXTENDED RELEASE ORAL DAILY
Qty: 30 CAPSULE | Refills: 0 | Status: SHIPPED | OUTPATIENT
Start: 2022-04-20 | End: 2022-05-20

## 2022-03-16 RX ORDER — DEXTROAMPHETAMINE SACCHARATE, AMPHETAMINE ASPARTATE, DEXTROAMPHETAMINE SULFATE AND AMPHETAMINE SULFATE 7.5; 7.5; 7.5; 7.5 MG/1; MG/1; MG/1; MG/1
30 TABLET ORAL DAILY
Qty: 30 TABLET | Refills: 0 | Status: SHIPPED | OUTPATIENT
Start: 2022-05-20 | End: 2022-06-19

## 2022-03-16 RX ORDER — DEXTROAMPHETAMINE SACCHARATE, AMPHETAMINE ASPARTATE, DEXTROAMPHETAMINE SULFATE AND AMPHETAMINE SULFATE 7.5; 7.5; 7.5; 7.5 MG/1; MG/1; MG/1; MG/1
30 TABLET ORAL DAILY
Qty: 30 TABLET | Refills: 0 | Status: SHIPPED | OUTPATIENT
Start: 2022-03-21 | End: 2022-04-20

## 2022-04-13 RX ORDER — DEXTROAMPHETAMINE SACCHARATE, AMPHETAMINE ASPARTATE MONOHYDRATE, DEXTROAMPHETAMINE SULFATE AND AMPHETAMINE SULFATE 7.5; 7.5; 7.5; 7.5 MG/1; MG/1; MG/1; MG/1
30 CAPSULE, EXTENDED RELEASE ORAL DAILY
Qty: 30 CAPSULE | Refills: 0 | OUTPATIENT
Start: 2022-04-13 | End: 2022-05-13

## 2022-04-13 RX ORDER — DEXTROAMPHETAMINE SACCHARATE, AMPHETAMINE ASPARTATE, DEXTROAMPHETAMINE SULFATE AND AMPHETAMINE SULFATE 7.5; 7.5; 7.5; 7.5 MG/1; MG/1; MG/1; MG/1
30 TABLET ORAL DAILY
Qty: 30 TABLET | Refills: 0 | OUTPATIENT
Start: 2022-04-13 | End: 2022-05-13

## 2022-04-13 NOTE — TELEPHONE ENCOUNTER
Spoke to pharmacy, they have refills on file. Advised pt to call pharmacy when needing refill. Has refills till June. Pt verbally understood.

## 2022-04-14 ENCOUNTER — TELEPHONE (OUTPATIENT)
Dept: FAMILY MEDICINE CLINIC | Facility: CLINIC | Age: 37
End: 2022-04-14

## 2022-04-14 NOTE — TELEPHONE ENCOUNTER
amphetamine-dextroamphetamine (ADDERALL) 30 MG Oral Tab    Pharmacy states they will not fill his script until Tuesday 4/19/22, but he will be completely out of pill Saturday 4/16/2022. Please advise. Thank you.

## 2022-04-14 NOTE — TELEPHONE ENCOUNTER
Called and spoke with pharmacy - Latonya Garrett #[phone]    Confirmed that pt picked up Rx both regular and XR Adderall on 03/20/2022 - both for 30 tabs    Earliest fill date for 04/19/22 -  Pt should still have supply at this time      Please refer to Refill request encounter 04/13/2022    [facility] sent to pt regarding notes above

## 2022-04-30 ENCOUNTER — MED REC SCAN ONLY (OUTPATIENT)
Dept: FAMILY MEDICINE CLINIC | Facility: CLINIC | Age: 37
End: 2022-04-30

## 2022-06-15 RX ORDER — DEXTROAMPHETAMINE SACCHARATE, AMPHETAMINE ASPARTATE, DEXTROAMPHETAMINE SULFATE AND AMPHETAMINE SULFATE 7.5; 7.5; 7.5; 7.5 MG/1; MG/1; MG/1; MG/1
30 TABLET ORAL DAILY
Qty: 30 TABLET | Refills: 0 | Status: SHIPPED | OUTPATIENT
Start: 2022-06-15 | End: 2022-07-15

## 2022-06-15 RX ORDER — DEXTROAMPHETAMINE SACCHARATE, AMPHETAMINE ASPARTATE MONOHYDRATE, DEXTROAMPHETAMINE SULFATE AND AMPHETAMINE SULFATE 7.5; 7.5; 7.5; 7.5 MG/1; MG/1; MG/1; MG/1
30 CAPSULE, EXTENDED RELEASE ORAL DAILY
Qty: 30 CAPSULE | Refills: 0 | Status: SHIPPED | OUTPATIENT
Start: 2022-06-15 | End: 2022-07-15

## 2022-06-15 NOTE — TELEPHONE ENCOUNTER
Routing to provider per protocol. amphetamine-dextroamphetamine (ADDERALL) 30 MG Oral Tab  Last refilled on 5/20/22 for #30  with 0 rf.     amphetamine-dextroamphetamine (ADDERALL XR) 30 MG Oral Capsule SR 24 Hr  Last refilled on 5/20/22 for #30  with 0 rf. Last labs 11/5/21. Last seen on 3/16/22 telemedicine. No future appointments. Thank you.

## 2022-07-13 RX ORDER — DEXTROAMPHETAMINE SACCHARATE, AMPHETAMINE ASPARTATE MONOHYDRATE, DEXTROAMPHETAMINE SULFATE AND AMPHETAMINE SULFATE 7.5; 7.5; 7.5; 7.5 MG/1; MG/1; MG/1; MG/1
30 CAPSULE, EXTENDED RELEASE ORAL DAILY
Qty: 30 CAPSULE | Refills: 0 | Status: SHIPPED | OUTPATIENT
Start: 2022-07-13 | End: 2022-08-12

## 2022-07-13 RX ORDER — DEXTROAMPHETAMINE SACCHARATE, AMPHETAMINE ASPARTATE, DEXTROAMPHETAMINE SULFATE AND AMPHETAMINE SULFATE 7.5; 7.5; 7.5; 7.5 MG/1; MG/1; MG/1; MG/1
30 TABLET ORAL DAILY
Qty: 30 TABLET | Refills: 0 | Status: SHIPPED | OUTPATIENT
Start: 2022-07-13 | End: 2022-08-12

## 2022-08-11 RX ORDER — DEXTROAMPHETAMINE SACCHARATE, AMPHETAMINE ASPARTATE, DEXTROAMPHETAMINE SULFATE AND AMPHETAMINE SULFATE 7.5; 7.5; 7.5; 7.5 MG/1; MG/1; MG/1; MG/1
30 TABLET ORAL DAILY
Qty: 30 TABLET | Refills: 0 | Status: SHIPPED | OUTPATIENT
Start: 2022-08-11 | End: 2022-09-10

## 2022-08-11 RX ORDER — DEXTROAMPHETAMINE SACCHARATE, AMPHETAMINE ASPARTATE MONOHYDRATE, DEXTROAMPHETAMINE SULFATE AND AMPHETAMINE SULFATE 7.5; 7.5; 7.5; 7.5 MG/1; MG/1; MG/1; MG/1
30 CAPSULE, EXTENDED RELEASE ORAL DAILY
Qty: 30 CAPSULE | Refills: 0 | Status: SHIPPED | OUTPATIENT
Start: 2022-08-11 | End: 2022-09-10

## 2022-08-11 NOTE — TELEPHONE ENCOUNTER
LOV/Telemedicine 03/16/2022    Last refill on 07/13/22, for #30 tabs, with 0 refills  amphetamine-dextroamphetamine (ADDERALL) 30 MG Oral Tab    Last refill on 07/13/22, for #30 caps, with 0 refills  amphetamine-dextroamphetamine ER (ADDERALL XR) 30 MG Oral Capsule SR 24 Hr    Future Appointments   Date Time Provider Kwame Guerra   8/16/2022  1:20 PM Kassandra Mac MD [facility] Asha Ott       Order(s) pending, please review. Thank you.

## 2022-08-25 ENCOUNTER — MED REC SCAN ONLY (OUTPATIENT)
Dept: FAMILY MEDICINE CLINIC | Facility: CLINIC | Age: 37
End: 2022-08-25

## 2022-11-09 ENCOUNTER — TELEPHONE (OUTPATIENT)
Dept: FAMILY MEDICINE CLINIC | Facility: CLINIC | Age: 37
End: 2022-11-09

## 2022-11-09 NOTE — TELEPHONE ENCOUNTER
[facility] [address] [place] [address], [address] AT [address] [place] [address] Rd [address] [facility] [address], [phone], [phone]   [address] [facility]   Phone: [phone] Fax: [phone]     PATIENT REQUESTING REFILL ON ADDERALL. HE SAYS PHARMACY TOLD HIM HE WAS A DAY TOO SOON. PATIENT THINKING THEY DIDN'T TAKE INTO ACCOUNT THAT LAST MONTH HAD 31 DAYS.

## 2022-12-08 DIAGNOSIS — Z51.81 MEDICATION MONITORING ENCOUNTER: ICD-10-CM

## 2022-12-08 DIAGNOSIS — F90.2 ATTENTION DEFICIT HYPERACTIVITY DISORDER (ADHD), COMBINED TYPE: ICD-10-CM

## 2022-12-08 RX ORDER — DEXTROAMPHETAMINE SACCHARATE, AMPHETAMINE ASPARTATE MONOHYDRATE, DEXTROAMPHETAMINE SULFATE AND AMPHETAMINE SULFATE 7.5; 7.5; 7.5; 7.5 MG/1; MG/1; MG/1; MG/1
30 CAPSULE, EXTENDED RELEASE ORAL DAILY
Qty: 30 CAPSULE | Refills: 0 | Status: SHIPPED | OUTPATIENT
Start: 2022-12-08 | End: 2023-01-07

## 2022-12-08 RX ORDER — DEXTROAMPHETAMINE SACCHARATE, AMPHETAMINE ASPARTATE, DEXTROAMPHETAMINE SULFATE AND AMPHETAMINE SULFATE 7.5; 7.5; 7.5; 7.5 MG/1; MG/1; MG/1; MG/1
30 TABLET ORAL DAILY
Qty: 30 TABLET | Refills: 0 | Status: SHIPPED | OUTPATIENT
Start: 2022-12-08 | End: 2023-01-07

## 2022-12-08 NOTE — TELEPHONE ENCOUNTER
No refill protocol for this medication. Adderall 30mg:  Last refill: 11/11/2022 #30 with 0 refills    Adderall XR 30mg:  Last refill: 11/11/2022 #30 with 0 refills      Last Visit: 8/23/2022 Telemed  Next Visit: No future appointments. Forward to Dr. Omid Mark please advise on refills. Thanks.

## 2023-02-10 ENCOUNTER — TELEPHONE (OUTPATIENT)
Dept: FAMILY MEDICINE CLINIC | Facility: CLINIC | Age: 38
End: 2023-02-10

## 2023-02-10 NOTE — TELEPHONE ENCOUNTER
Called pharmacy and they will get his adderall 30mg rx ready for him. Pt still has one other rx for the month of march. They are still out of the adderall xr 30mg (still on back order). Patient was notified. He states he will go without the extended release for now.

## 2023-02-10 NOTE — TELEPHONE ENCOUNTER
Pt called he wants to know if we could please reach out to pharmacy about his 2    amphetamine-dextroamphetamine ER (ADDERALL XR) 30 MG Oral Capsule SR 24 Hr    amphetamine-dextroamphetamine (ADDERALL) 30 MG Oral Tab    Scripts to be refilled today. He said that he will be out of medication tomorrow.

## 2023-04-11 RX ORDER — DEXTROAMPHETAMINE SACCHARATE, AMPHETAMINE ASPARTATE MONOHYDRATE, DEXTROAMPHETAMINE SULFATE AND AMPHETAMINE SULFATE 7.5; 7.5; 7.5; 7.5 MG/1; MG/1; MG/1; MG/1
30 CAPSULE, EXTENDED RELEASE ORAL DAILY
Qty: 30 CAPSULE | Refills: 0 | Status: SHIPPED | OUTPATIENT
Start: 2023-04-11 | End: 2023-05-11

## 2023-04-11 RX ORDER — DEXTROAMPHETAMINE SACCHARATE, AMPHETAMINE ASPARTATE, DEXTROAMPHETAMINE SULFATE AND AMPHETAMINE SULFATE 7.5; 7.5; 7.5; 7.5 MG/1; MG/1; MG/1; MG/1
30 TABLET ORAL DAILY
Qty: 30 TABLET | Refills: 0 | Status: SHIPPED | OUTPATIENT
Start: 2023-04-11 | End: 2023-05-11

## 2023-05-10 RX ORDER — DEXTROAMPHETAMINE SACCHARATE, AMPHETAMINE ASPARTATE, DEXTROAMPHETAMINE SULFATE AND AMPHETAMINE SULFATE 7.5; 7.5; 7.5; 7.5 MG/1; MG/1; MG/1; MG/1
30 TABLET ORAL DAILY
Qty: 30 TABLET | Refills: 0 | Status: SHIPPED | OUTPATIENT
Start: 2023-05-10 | End: 2023-06-09

## 2023-05-10 RX ORDER — DEXTROAMPHETAMINE SACCHARATE, AMPHETAMINE ASPARTATE MONOHYDRATE, DEXTROAMPHETAMINE SULFATE AND AMPHETAMINE SULFATE 7.5; 7.5; 7.5; 7.5 MG/1; MG/1; MG/1; MG/1
30 CAPSULE, EXTENDED RELEASE ORAL DAILY
Qty: 30 CAPSULE | Refills: 0 | Status: SHIPPED | OUTPATIENT
Start: 2023-05-10 | End: 2023-06-09

## 2023-05-10 NOTE — TELEPHONE ENCOUNTER
We will need to see him for his Physical / in-person prior to any more refills. Please have him schedule with either ROCHELLE / MD. Thank you.

## 2023-05-10 NOTE — TELEPHONE ENCOUNTER
Patient notified and verbalized understanding.    Future Appointments   Date Time Provider Kwame Guerra   5/23/2023  3:00 PM ROCHELLE Vasquez [facility] MASHA Han

## 2023-05-10 NOTE — TELEPHONE ENCOUNTER
Needs physical / follow up in-person appointment prior to future refills. No more refills after this one without an in-person follow up.

## 2023-05-23 ENCOUNTER — OFFICE VISIT (OUTPATIENT)
Dept: FAMILY MEDICINE CLINIC | Facility: CLINIC | Age: 38
End: 2023-05-23
Payer: COMMERCIAL

## 2023-05-23 VITALS
HEART RATE: 87 BPM | TEMPERATURE: 97 F | SYSTOLIC BLOOD PRESSURE: 96 MMHG | DIASTOLIC BLOOD PRESSURE: 56 MMHG | BODY MASS INDEX: 23.95 KG/M2 | OXYGEN SATURATION: 98 % | WEIGHT: 158 LBS | RESPIRATION RATE: 16 BRPM | HEIGHT: 68 IN

## 2023-05-23 DIAGNOSIS — Z13.220 SCREENING FOR LIPID DISORDERS: ICD-10-CM

## 2023-05-23 DIAGNOSIS — Z23 ENCOUNTER FOR IMMUNIZATION: ICD-10-CM

## 2023-05-23 DIAGNOSIS — Z00.00 ANNUAL PHYSICAL EXAM: Primary | ICD-10-CM

## 2023-05-23 LAB
ALBUMIN SERPL-MCNC: 4.6 G/DL (ref 3.4–5)
ALBUMIN/GLOB SERPL: 1.5 {RATIO} (ref 1–2)
ALP LIVER SERPL-CCNC: 65 U/L
ALT SERPL-CCNC: 28 U/L
ANION GAP SERPL CALC-SCNC: 3 MMOL/L (ref 0–18)
AST SERPL-CCNC: 19 U/L (ref 15–37)
BASOPHILS # BLD AUTO: 0.02 X10(3) UL (ref 0–0.2)
BASOPHILS NFR BLD AUTO: 0.4 %
BILIRUB SERPL-MCNC: 0.4 MG/DL (ref 0.1–2)
BUN BLD-MCNC: 15 MG/DL (ref 7–18)
CALCIUM BLD-MCNC: 9.3 MG/DL (ref 8.5–10.1)
CHLORIDE SERPL-SCNC: 107 MMOL/L (ref 98–112)
CHOLEST SERPL-MCNC: 193 MG/DL (ref ?–200)
CO2 SERPL-SCNC: 29 MMOL/L (ref 21–32)
CREAT BLD-MCNC: 0.98 MG/DL
EOSINOPHIL # BLD AUTO: 0.1 X10(3) UL (ref 0–0.7)
EOSINOPHIL NFR BLD AUTO: 2.2 %
ERYTHROCYTE [DISTWIDTH] IN BLOOD BY AUTOMATED COUNT: 12 %
FASTING PATIENT LIPID ANSWER: NO
FASTING STATUS PATIENT QL REPORTED: NO
GFR SERPLBLD BASED ON 1.73 SQ M-ARVRAT: 102 ML/MIN/1.73M2 (ref 60–?)
GLOBULIN PLAS-MCNC: 3 G/DL (ref 2.8–4.4)
GLUCOSE BLD-MCNC: 85 MG/DL (ref 70–99)
HCT VFR BLD AUTO: 42.2 %
HDLC SERPL-MCNC: 69 MG/DL (ref 40–59)
HGB BLD-MCNC: 14.6 G/DL
IMM GRANULOCYTES # BLD AUTO: 0.01 X10(3) UL (ref 0–1)
IMM GRANULOCYTES NFR BLD: 0.2 %
LDLC SERPL CALC-MCNC: 88 MG/DL (ref ?–100)
LYMPHOCYTES # BLD AUTO: 1.49 X10(3) UL (ref 1–4)
LYMPHOCYTES NFR BLD AUTO: 32 %
MCH RBC QN AUTO: 30.8 PG (ref 26–34)
MCHC RBC AUTO-ENTMCNC: 34.6 G/DL (ref 31–37)
MCV RBC AUTO: 89 FL
MONOCYTES # BLD AUTO: 0.34 X10(3) UL (ref 0.1–1)
MONOCYTES NFR BLD AUTO: 7.3 %
NEUTROPHILS # BLD AUTO: 2.69 X10 (3) UL (ref 1.5–7.7)
NEUTROPHILS # BLD AUTO: 2.69 X10(3) UL (ref 1.5–7.7)
NEUTROPHILS NFR BLD AUTO: 57.9 %
NONHDLC SERPL-MCNC: 124 MG/DL (ref ?–130)
OSMOLALITY SERPL CALC.SUM OF ELEC: 288 MOSM/KG (ref 275–295)
PLATELET # BLD AUTO: 258 10(3)UL (ref 150–450)
POTASSIUM SERPL-SCNC: 4.3 MMOL/L (ref 3.5–5.1)
PROT SERPL-MCNC: 7.6 G/DL (ref 6.4–8.2)
RBC # BLD AUTO: 4.74 X10(6)UL
SODIUM SERPL-SCNC: 139 MMOL/L (ref 136–145)
TRIGL SERPL-MCNC: 220 MG/DL (ref 30–149)
TSI SER-ACNC: 1.38 MIU/ML (ref 0.36–3.74)
VLDLC SERPL CALC-MCNC: 35 MG/DL (ref 0–30)
WBC # BLD AUTO: 4.7 X10(3) UL (ref 4–11)

## 2023-05-23 PROCEDURE — 99395 PREV VISIT EST AGE 18-39: CPT | Performed by: NURSE PRACTITIONER

## 2023-05-23 PROCEDURE — 3078F DIAST BP <80 MM HG: CPT | Performed by: NURSE PRACTITIONER

## 2023-05-23 PROCEDURE — 90471 IMMUNIZATION ADMIN: CPT | Performed by: NURSE PRACTITIONER

## 2023-05-23 PROCEDURE — 80050 GENERAL HEALTH PANEL: CPT | Performed by: NURSE PRACTITIONER

## 2023-05-23 PROCEDURE — 3008F BODY MASS INDEX DOCD: CPT | Performed by: NURSE PRACTITIONER

## 2023-05-23 PROCEDURE — 3074F SYST BP LT 130 MM HG: CPT | Performed by: NURSE PRACTITIONER

## 2023-05-23 PROCEDURE — 90715 TDAP VACCINE 7 YRS/> IM: CPT | Performed by: NURSE PRACTITIONER

## 2023-05-23 PROCEDURE — 80061 LIPID PANEL: CPT | Performed by: NURSE PRACTITIONER

## 2023-06-07 RX ORDER — DEXTROAMPHETAMINE SACCHARATE, AMPHETAMINE ASPARTATE MONOHYDRATE, DEXTROAMPHETAMINE SULFATE AND AMPHETAMINE SULFATE 7.5; 7.5; 7.5; 7.5 MG/1; MG/1; MG/1; MG/1
30 CAPSULE, EXTENDED RELEASE ORAL DAILY
Qty: 30 CAPSULE | Refills: 0 | Status: SHIPPED | OUTPATIENT
Start: 2023-06-07 | End: 2023-07-07

## 2023-06-07 RX ORDER — DEXTROAMPHETAMINE SACCHARATE, AMPHETAMINE ASPARTATE, DEXTROAMPHETAMINE SULFATE AND AMPHETAMINE SULFATE 7.5; 7.5; 7.5; 7.5 MG/1; MG/1; MG/1; MG/1
30 TABLET ORAL DAILY
Qty: 30 TABLET | Refills: 0 | Status: SHIPPED | OUTPATIENT
Start: 2023-06-07 | End: 2023-07-07

## 2023-07-05 NOTE — TELEPHONE ENCOUNTER
Both medications last refilled 6/7/23  LOV with [facility] 5/23/23  No future appt  Protocol: none for both medications  Routed to pcp to advise

## 2023-07-05 NOTE — TELEPHONE ENCOUNTER
LOV 05/23/23 w/ APRN  Last refill on 06/07/23, for #30 caps, with 0 refills  amphetamine-dextroamphetamine ER (ADDERALL XR) 30 MG Oral Capsule SR 24 Hr     amphetamine-dextroamphetamine (ADDERALL) 30 MG Oral Tab     No future appointments.

## 2023-07-06 RX ORDER — DEXTROAMPHETAMINE SACCHARATE, AMPHETAMINE ASPARTATE MONOHYDRATE, DEXTROAMPHETAMINE SULFATE AND AMPHETAMINE SULFATE 7.5; 7.5; 7.5; 7.5 MG/1; MG/1; MG/1; MG/1
30 CAPSULE, EXTENDED RELEASE ORAL DAILY
Qty: 30 CAPSULE | Refills: 0 | Status: SHIPPED | OUTPATIENT
Start: 2023-07-06 | End: 2023-08-05

## 2023-07-06 RX ORDER — DEXTROAMPHETAMINE SACCHARATE, AMPHETAMINE ASPARTATE, DEXTROAMPHETAMINE SULFATE AND AMPHETAMINE SULFATE 7.5; 7.5; 7.5; 7.5 MG/1; MG/1; MG/1; MG/1
30 TABLET ORAL DAILY
Qty: 30 TABLET | Refills: 0 | Status: SHIPPED | OUTPATIENT
Start: 2023-07-06 | End: 2023-08-05

## 2023-09-05 DIAGNOSIS — F90.2 ATTENTION DEFICIT HYPERACTIVITY DISORDER (ADHD), COMBINED TYPE: ICD-10-CM

## 2023-09-05 RX ORDER — DEXTROAMPHETAMINE SACCHARATE, AMPHETAMINE ASPARTATE, DEXTROAMPHETAMINE SULFATE AND AMPHETAMINE SULFATE 7.5; 7.5; 7.5; 7.5 MG/1; MG/1; MG/1; MG/1
30 TABLET ORAL DAILY
Qty: 30 TABLET | Refills: 0 | Status: CANCELLED | OUTPATIENT
Start: 2023-09-05

## 2023-09-05 RX ORDER — DEXTROAMPHETAMINE SACCHARATE, AMPHETAMINE ASPARTATE MONOHYDRATE, DEXTROAMPHETAMINE SULFATE AND AMPHETAMINE SULFATE 7.5; 7.5; 7.5; 7.5 MG/1; MG/1; MG/1; MG/1
30 CAPSULE, EXTENDED RELEASE ORAL DAILY
Qty: 30 CAPSULE | Refills: 0 | Status: CANCELLED | OUTPATIENT
Start: 2023-09-05 | End: 2023-10-05

## 2023-09-08 ENCOUNTER — TELEPHONE (OUTPATIENT)
Dept: FAMILY MEDICINE CLINIC | Facility: CLINIC | Age: 38
End: 2023-09-08

## 2023-09-08 NOTE — TELEPHONE ENCOUNTER
[facility] sent to patient reminding him he is due for the following per pt reminder:    ANALI Mayfield Mydhili Nurse  Follow-up in 3 months for bp check and rx refills

## 2023-09-19 ENCOUNTER — OFFICE VISIT (OUTPATIENT)
Dept: FAMILY MEDICINE CLINIC | Facility: CLINIC | Age: 38
End: 2023-09-19
Payer: COMMERCIAL

## 2023-09-19 VITALS
SYSTOLIC BLOOD PRESSURE: 132 MMHG | BODY MASS INDEX: 24.71 KG/M2 | HEART RATE: 102 BPM | TEMPERATURE: 98 F | DIASTOLIC BLOOD PRESSURE: 80 MMHG | WEIGHT: 163 LBS | HEIGHT: 68 IN | RESPIRATION RATE: 16 BRPM | OXYGEN SATURATION: 98 %

## 2023-09-19 DIAGNOSIS — F90.2 ATTENTION DEFICIT HYPERACTIVITY DISORDER (ADHD), COMBINED TYPE: ICD-10-CM

## 2023-09-19 DIAGNOSIS — E78.1 HYPERTRIGLYCERIDEMIA: ICD-10-CM

## 2023-09-19 DIAGNOSIS — Z00.00 ANNUAL PHYSICAL EXAM: Primary | ICD-10-CM

## 2023-09-19 LAB
CHOLEST SERPL-MCNC: 184 MG/DL (ref ?–200)
FASTING PATIENT LIPID ANSWER: NO
HDLC SERPL-MCNC: 80 MG/DL (ref 40–59)
LDLC SERPL CALC-MCNC: 90 MG/DL (ref ?–100)
NONHDLC SERPL-MCNC: 104 MG/DL (ref ?–130)
TRIGL SERPL-MCNC: 75 MG/DL (ref 30–149)
VLDLC SERPL CALC-MCNC: 12 MG/DL (ref 0–30)

## 2023-09-19 PROCEDURE — 3008F BODY MASS INDEX DOCD: CPT | Performed by: NURSE PRACTITIONER

## 2023-09-19 PROCEDURE — 3079F DIAST BP 80-89 MM HG: CPT | Performed by: NURSE PRACTITIONER

## 2023-09-19 PROCEDURE — 99395 PREV VISIT EST AGE 18-39: CPT | Performed by: NURSE PRACTITIONER

## 2023-09-19 PROCEDURE — 3075F SYST BP GE 130 - 139MM HG: CPT | Performed by: NURSE PRACTITIONER

## 2023-09-19 PROCEDURE — 80061 LIPID PANEL: CPT | Performed by: NURSE PRACTITIONER

## 2023-09-19 RX ORDER — DEXTROAMPHETAMINE SACCHARATE, AMPHETAMINE ASPARTATE MONOHYDRATE, DEXTROAMPHETAMINE SULFATE AND AMPHETAMINE SULFATE 7.5; 7.5; 7.5; 7.5 MG/1; MG/1; MG/1; MG/1
30 CAPSULE, EXTENDED RELEASE ORAL DAILY
Qty: 30 CAPSULE | Refills: 0 | Status: SHIPPED | OUTPATIENT
Start: 2023-11-04 | End: 2023-12-04

## 2023-09-19 RX ORDER — DEXTROAMPHETAMINE SACCHARATE, AMPHETAMINE ASPARTATE MONOHYDRATE, DEXTROAMPHETAMINE SULFATE AND AMPHETAMINE SULFATE 7.5; 7.5; 7.5; 7.5 MG/1; MG/1; MG/1; MG/1
30 CAPSULE, EXTENDED RELEASE ORAL DAILY
Qty: 30 CAPSULE | Refills: 0 | Status: SHIPPED | OUTPATIENT
Start: 2023-10-05

## 2023-09-19 RX ORDER — DEXTROAMPHETAMINE SACCHARATE, AMPHETAMINE ASPARTATE, DEXTROAMPHETAMINE SULFATE AND AMPHETAMINE SULFATE 7.5; 7.5; 7.5; 7.5 MG/1; MG/1; MG/1; MG/1
30 TABLET ORAL DAILY
Qty: 30 TABLET | Refills: 0 | Status: SHIPPED | OUTPATIENT
Start: 2023-10-05 | End: 2023-11-04

## 2023-09-19 RX ORDER — DEXTROAMPHETAMINE SACCHARATE, AMPHETAMINE ASPARTATE, DEXTROAMPHETAMINE SULFATE AND AMPHETAMINE SULFATE 7.5; 7.5; 7.5; 7.5 MG/1; MG/1; MG/1; MG/1
30 TABLET ORAL DAILY
Qty: 30 TABLET | Refills: 0 | Status: SHIPPED | OUTPATIENT
Start: 2023-12-04 | End: 2024-01-03

## 2023-09-19 RX ORDER — DEXTROAMPHETAMINE SACCHARATE, AMPHETAMINE ASPARTATE MONOHYDRATE, DEXTROAMPHETAMINE SULFATE AND AMPHETAMINE SULFATE 7.5; 7.5; 7.5; 7.5 MG/1; MG/1; MG/1; MG/1
30 CAPSULE, EXTENDED RELEASE ORAL DAILY
Qty: 30 CAPSULE | Refills: 0 | Status: SHIPPED | OUTPATIENT
Start: 2023-12-04 | End: 2024-01-03

## 2023-09-19 RX ORDER — DEXTROAMPHETAMINE SACCHARATE, AMPHETAMINE ASPARTATE, DEXTROAMPHETAMINE SULFATE AND AMPHETAMINE SULFATE 7.5; 7.5; 7.5; 7.5 MG/1; MG/1; MG/1; MG/1
30 TABLET ORAL DAILY
Qty: 30 TABLET | Refills: 0 | Status: SHIPPED | OUTPATIENT
Start: 2023-11-04 | End: 2023-12-04

## 2023-09-21 ENCOUNTER — TELEPHONE (OUTPATIENT)
Dept: FAMILY MEDICINE CLINIC | Facility: CLINIC | Age: 38
End: 2023-09-21

## 2023-09-21 NOTE — TELEPHONE ENCOUNTER
----- Message from ROCHELLE Carter sent at 9/21/2023 10:20 AM CDT -----  LIPID PANEL - Impressive changes. Cholesterol levels are all healthy. Follow-up in 1 year or sooner if you need us.

## 2023-11-13 NOTE — PROGRESS NOTES
Group Topic: Social Skills   Group Date: 11/13/2023  Start Time: 1000  End Time: 1035  Facilitators: MANDA Farooq   Department: [facility] REH[facility] THER[facility] VIRTUAL    Number of Participants: 3   Group Focus: social skills  Treatment Modality: Other: Recreation Therapy  Interventions utilized were mental fitness  Purpose: other: increase socialization, increase stimulation, enjoyment     Name: Alexander Zavala YOB: 1984   MR: [id number]      Facilitator: Recreational Therapist  Level of Participation: did not attend  Quality of Participation:  did not attend  Interactions with others:  did not attend  Mood/Affect:  did not attend  Triggers (if applicable): n/a  Cognition:  did not attend  Progress: None  Comments: PT declined group   Plan: continue with services       INITIAL EVALUATION:   Referring Physician: Dr. Kamaljit Walters  Diagnosis:     -Chronic bilateral low back pain with right-sided sciatica    Date of Service: 3/12/2019       PATIENT SUMMARY    Zoë Purcell is a 29year old male; works manfacturing; tool and die potential discogenic; initial emphasis on extensor restoration with progression to include back extensor training; back training and education  -Jacob Chatterjee would benefit from services of the physical therapist to address the above impairments to address current

## 2023-12-29 DIAGNOSIS — F90.2 ATTENTION DEFICIT HYPERACTIVITY DISORDER (ADHD), COMBINED TYPE: ICD-10-CM

## 2023-12-29 NOTE — TELEPHONE ENCOUNTER
LOV 9/19/23     Last Refill   amphetamine-dextroamphetamine (ADDERALL) 30 MG Oral Tab 30 tablet 0 12/4/2023       amphetamine-dextroamphetamine ER (ADDERALL XR) 30 MG Oral Capsule SR 24 Hr 30 capsule 0 12/4/2023       No future appointments.

## 2024-01-01 RX ORDER — DEXTROAMPHETAMINE SACCHARATE, AMPHETAMINE ASPARTATE, DEXTROAMPHETAMINE SULFATE AND AMPHETAMINE SULFATE 7.5; 7.5; 7.5; 7.5 MG/1; MG/1; MG/1; MG/1
30 TABLET ORAL DAILY
Qty: 30 TABLET | Refills: 0 | Status: SHIPPED | OUTPATIENT
Start: 2024-01-01 | End: 2024-01-31

## 2024-01-01 RX ORDER — DEXTROAMPHETAMINE SACCHARATE, AMPHETAMINE ASPARTATE MONOHYDRATE, DEXTROAMPHETAMINE SULFATE AND AMPHETAMINE SULFATE 7.5; 7.5; 7.5; 7.5 MG/1; MG/1; MG/1; MG/1
30 CAPSULE, EXTENDED RELEASE ORAL DAILY
Qty: 30 CAPSULE | Refills: 0 | Status: SHIPPED | OUTPATIENT
Start: 2024-01-01

## 2024-01-03 DIAGNOSIS — F90.2 ATTENTION DEFICIT HYPERACTIVITY DISORDER (ADHD), COMBINED TYPE: ICD-10-CM

## 2024-01-03 NOTE — TELEPHONE ENCOUNTER
Magali Jha, [address]    Pt states adderall 20 mg tabs are in stock at above pharm, pt asking if he can have rx for the 20 mg tabs and just cut the pill in half to get his dose of 30 mg.     Please advise pt, thank you!

## 2024-01-04 RX ORDER — DEXTROAMPHETAMINE SACCHARATE, AMPHETAMINE ASPARTATE, DEXTROAMPHETAMINE SULFATE AND AMPHETAMINE SULFATE 5; 5; 5; 5 MG/1; MG/1; MG/1; MG/1
20 TABLET ORAL DAILY
Qty: 30 TABLET | Refills: 0 | Status: SHIPPED | OUTPATIENT
Start: 2024-01-04

## 2024-01-04 RX ORDER — DEXTROAMPHETAMINE SACCHARATE, AMPHETAMINE ASPARTATE, DEXTROAMPHETAMINE SULFATE AND AMPHETAMINE SULFATE 2.5; 2.5; 2.5; 2.5 MG/1; MG/1; MG/1; MG/1
10 TABLET ORAL DAILY
Qty: 30 TABLET | Refills: 0 | Status: SHIPPED | OUTPATIENT
Start: 2024-01-04

## 2024-01-04 NOTE — TELEPHONE ENCOUNTER
02/04/20      Re:   Le Barlow  [address] [place] [address]         To whom it may concern:    Le Barlow can work as a , but due to medical conditions should be allowed to sit periodically/between customers, such as on a stool.            Sincerely,      Allan Carrera MD  [facility]  [address] [place] [address] [phone]   Patient states 30mg tabs are not available at pharmacy, sending new rx of 10mg and 20mg for total of 30mg PO once daily

## 2024-02-01 DIAGNOSIS — F90.2 ATTENTION DEFICIT HYPERACTIVITY DISORDER (ADHD), COMBINED TYPE: ICD-10-CM

## 2024-02-01 RX ORDER — DEXTROAMPHETAMINE SACCHARATE, AMPHETAMINE ASPARTATE MONOHYDRATE, DEXTROAMPHETAMINE SULFATE AND AMPHETAMINE SULFATE 7.5; 7.5; 7.5; 7.5 MG/1; MG/1; MG/1; MG/1
30 CAPSULE, EXTENDED RELEASE ORAL DAILY
Qty: 30 CAPSULE | Refills: 0 | Status: CANCELLED | OUTPATIENT
Start: 2024-02-01

## 2024-02-02 NOTE — TELEPHONE ENCOUNTER
Pt failed refill protocol for the following reasons:   amphetamine-dextroamphetamine ER (ADDERALL XR) 30 MG Oral Capsule SR 24 Hr         Sig: Take 1 capsule (30 mg total) by mouth daily.    Disp: 30 capsule    Refills: 0    Start: 2/1/2024    Earliest Fill Date: 2/1/2024    Class: Normal    Non-formulary For: Attention deficit hyperactivity disorder (ADHD), combined type    Last ordered: 1 month ago (1/1/2024) by ROCHELLE [facility]    Patient comment: Hello i normally have the 30mg aderall tablets on here too, but did not see it listed(looks like it was replaced with 10/20 from when they were out last). I would like to get both xr and 30 mg tablets tefilled if possible.       To be filled at: [facility] #[id number] [address] AT [facility] OF [address], [phone], [phone]         Last refill: 1/4/24  Last appt: 9/19/23  Next appt: No future appointments.      Forward to Nurse Practitioner Yelitza William , please advise on refills. Thank you.

## 2024-02-04 NOTE — TELEPHONE ENCOUNTER
It looks like he picked up a prescription on 1/22 per [place] database, can we confirm what he needs filled?

## 2024-02-05 NOTE — TELEPHONE ENCOUNTER
Pt returned call- the pharmacy was back ordered on his current script    Pt had the IR 20mg and 10mg on his chart because the 30XR were not in stock    Pt states the 1/22 script was picked up and it is the IR script    Pt is looking to have the 30mg XR filled because he would like to get back on the xtended release and not the IR    Patient has been taking and 30mg XR and 30mg IR in the past.

## 2024-02-06 RX ORDER — DEXTROAMPHETAMINE SACCHARATE, AMPHETAMINE ASPARTATE MONOHYDRATE, DEXTROAMPHETAMINE SULFATE AND AMPHETAMINE SULFATE 7.5; 7.5; 7.5; 7.5 MG/1; MG/1; MG/1; MG/1
30 CAPSULE, EXTENDED RELEASE ORAL DAILY
Qty: 30 CAPSULE | Refills: 0 | Status: SHIPPED | OUTPATIENT
Start: 2024-02-06

## 2024-02-22 DIAGNOSIS — F90.2 ATTENTION DEFICIT HYPERACTIVITY DISORDER (ADHD), COMBINED TYPE: ICD-10-CM

## 2024-02-24 RX ORDER — DEXTROAMPHETAMINE SACCHARATE, AMPHETAMINE ASPARTATE, DEXTROAMPHETAMINE SULFATE AND AMPHETAMINE SULFATE 5; 5; 5; 5 MG/1; MG/1; MG/1; MG/1
20 TABLET ORAL DAILY
Qty: 30 TABLET | Refills: 0 | Status: SHIPPED | OUTPATIENT
Start: 2024-02-24

## 2024-02-24 RX ORDER — DEXTROAMPHETAMINE SACCHARATE, AMPHETAMINE ASPARTATE, DEXTROAMPHETAMINE SULFATE AND AMPHETAMINE SULFATE 2.5; 2.5; 2.5; 2.5 MG/1; MG/1; MG/1; MG/1
10 TABLET ORAL DAILY
Qty: 30 TABLET | Refills: 0 | Status: SHIPPED | OUTPATIENT
Start: 2024-02-24

## 2024-04-01 DIAGNOSIS — F90.2 ATTENTION DEFICIT HYPERACTIVITY DISORDER (ADHD), COMBINED TYPE: ICD-10-CM

## 2024-04-01 RX ORDER — DEXTROAMPHETAMINE SACCHARATE, AMPHETAMINE ASPARTATE MONOHYDRATE, DEXTROAMPHETAMINE SULFATE AND AMPHETAMINE SULFATE 7.5; 7.5; 7.5; 7.5 MG/1; MG/1; MG/1; MG/1
30 CAPSULE, EXTENDED RELEASE ORAL DAILY
Qty: 30 CAPSULE | Refills: 0 | Status: SHIPPED | OUTPATIENT
Start: 2024-05-01

## 2024-04-01 RX ORDER — DEXTROAMPHETAMINE SACCHARATE, AMPHETAMINE ASPARTATE MONOHYDRATE, DEXTROAMPHETAMINE SULFATE AND AMPHETAMINE SULFATE 7.5; 7.5; 7.5; 7.5 MG/1; MG/1; MG/1; MG/1
30 CAPSULE, EXTENDED RELEASE ORAL DAILY
Qty: 30 CAPSULE | Refills: 0 | Status: SHIPPED | OUTPATIENT
Start: 2024-04-01

## 2024-04-01 RX ORDER — DEXTROAMPHETAMINE SACCHARATE, AMPHETAMINE ASPARTATE, DEXTROAMPHETAMINE SULFATE AND AMPHETAMINE SULFATE 5; 5; 5; 5 MG/1; MG/1; MG/1; MG/1
20 TABLET ORAL DAILY
Qty: 30 TABLET | Refills: 0 | Status: CANCELLED | OUTPATIENT
Start: 2024-04-01

## 2024-04-01 RX ORDER — DEXTROAMPHETAMINE SACCHARATE, AMPHETAMINE ASPARTATE, DEXTROAMPHETAMINE SULFATE AND AMPHETAMINE SULFATE 2.5; 2.5; 2.5; 2.5 MG/1; MG/1; MG/1; MG/1
10 TABLET ORAL DAILY
Qty: 30 TABLET | Refills: 0 | Status: CANCELLED | OUTPATIENT
Start: 2024-04-01

## 2024-04-01 RX ORDER — DEXTROAMPHETAMINE SACCHARATE, AMPHETAMINE ASPARTATE MONOHYDRATE, DEXTROAMPHETAMINE SULFATE AND AMPHETAMINE SULFATE 7.5; 7.5; 7.5; 7.5 MG/1; MG/1; MG/1; MG/1
30 CAPSULE, EXTENDED RELEASE ORAL DAILY
Qty: 30 CAPSULE | Refills: 0 | Status: SHIPPED | OUTPATIENT
Start: 2024-05-31

## 2024-04-16 ENCOUNTER — PATIENT OUTREACH (OUTPATIENT)
Dept: FAMILY MEDICINE CLINIC | Facility: CLINIC | Age: 39
End: 2024-04-16

## 2024-05-01 DIAGNOSIS — F90.2 ATTENTION DEFICIT HYPERACTIVITY DISORDER (ADHD), COMBINED TYPE: ICD-10-CM

## 2024-05-02 RX ORDER — DEXTROAMPHETAMINE SACCHARATE, AMPHETAMINE ASPARTATE MONOHYDRATE, DEXTROAMPHETAMINE SULFATE AND AMPHETAMINE SULFATE 7.5; 7.5; 7.5; 7.5 MG/1; MG/1; MG/1; MG/1
30 CAPSULE, EXTENDED RELEASE ORAL DAILY
Qty: 30 CAPSULE | Refills: 0 | Status: SHIPPED | OUTPATIENT
Start: 2024-05-02

## 2024-05-02 RX ORDER — DEXTROAMPHETAMINE SACCHARATE, AMPHETAMINE ASPARTATE, DEXTROAMPHETAMINE SULFATE AND AMPHETAMINE SULFATE 7.5; 7.5; 7.5; 7.5 MG/1; MG/1; MG/1; MG/1
30 TABLET ORAL DAILY
Qty: 30 TABLET | Refills: 0 | Status: SHIPPED | OUTPATIENT
Start: 2024-05-02 | End: 2024-06-01

## 2024-07-01 ENCOUNTER — OFFICE VISIT (OUTPATIENT)
Dept: FAMILY MEDICINE CLINIC | Facility: CLINIC | Age: 39
End: 2024-07-01
Payer: COMMERCIAL

## 2024-07-01 VITALS
HEIGHT: 68 IN | BODY MASS INDEX: 24.76 KG/M2 | OXYGEN SATURATION: 100 % | DIASTOLIC BLOOD PRESSURE: 72 MMHG | WEIGHT: 163.38 LBS | HEART RATE: 105 BPM | SYSTOLIC BLOOD PRESSURE: 112 MMHG | TEMPERATURE: 98 F | RESPIRATION RATE: 18 BRPM

## 2024-07-01 DIAGNOSIS — J00 ACUTE RHINITIS: ICD-10-CM

## 2024-07-01 DIAGNOSIS — H66.011 NON-RECURRENT ACUTE SUPPURATIVE OTITIS MEDIA OF RIGHT EAR WITH SPONTANEOUS RUPTURE OF TYMPANIC MEMBRANE: Primary | ICD-10-CM

## 2024-07-01 DIAGNOSIS — F90.2 ATTENTION DEFICIT HYPERACTIVITY DISORDER (ADHD), COMBINED TYPE: ICD-10-CM

## 2024-07-01 DIAGNOSIS — R06.83 SNORING: ICD-10-CM

## 2024-07-01 PROCEDURE — 99213 OFFICE O/P EST LOW 20 MIN: CPT | Performed by: NURSE PRACTITIONER

## 2024-07-01 PROCEDURE — 3078F DIAST BP <80 MM HG: CPT | Performed by: NURSE PRACTITIONER

## 2024-07-01 PROCEDURE — 3008F BODY MASS INDEX DOCD: CPT | Performed by: NURSE PRACTITIONER

## 2024-07-01 PROCEDURE — 3074F SYST BP LT 130 MM HG: CPT | Performed by: NURSE PRACTITIONER

## 2024-07-01 RX ORDER — BENZONATATE 100 MG/1
100 CAPSULE ORAL 3 TIMES DAILY
COMMUNITY
Start: 2024-05-23 | End: 2024-07-01

## 2024-07-01 RX ORDER — DEXTROAMPHETAMINE SACCHARATE, AMPHETAMINE ASPARTATE, DEXTROAMPHETAMINE SULFATE AND AMPHETAMINE SULFATE 7.5; 7.5; 7.5; 7.5 MG/1; MG/1; MG/1; MG/1
30 TABLET ORAL DAILY
Qty: 30 TABLET | Refills: 0 | Status: SHIPPED | OUTPATIENT
Start: 2024-07-31 | End: 2024-08-30

## 2024-07-01 RX ORDER — DEXTROAMPHETAMINE SACCHARATE, AMPHETAMINE ASPARTATE MONOHYDRATE, DEXTROAMPHETAMINE SULFATE AND AMPHETAMINE SULFATE 7.5; 7.5; 7.5; 7.5 MG/1; MG/1; MG/1; MG/1
30 CAPSULE, EXTENDED RELEASE ORAL EVERY MORNING
Qty: 30 CAPSULE | Refills: 0 | Status: SHIPPED | OUTPATIENT
Start: 2024-07-31

## 2024-07-01 RX ORDER — IBUPROFEN 600 MG/1
600 TABLET ORAL EVERY 6 HOURS PRN
COMMUNITY

## 2024-07-01 RX ORDER — FLUTICASONE PROPIONATE 50 MCG
2 SPRAY, SUSPENSION (ML) NASAL DAILY
Qty: 11.1 ML | Refills: 0 | Status: SHIPPED | OUTPATIENT
Start: 2024-07-01 | End: 2025-06-26

## 2024-07-01 RX ORDER — AMOXICILLIN 500 MG/1
500 CAPSULE ORAL EVERY 12 HOURS
COMMUNITY
Start: 2024-05-26

## 2024-07-01 RX ORDER — DEXTROAMPHETAMINE SACCHARATE, AMPHETAMINE ASPARTATE, DEXTROAMPHETAMINE SULFATE AND AMPHETAMINE SULFATE 7.5; 7.5; 7.5; 7.5 MG/1; MG/1; MG/1; MG/1
30 TABLET ORAL DAILY
Qty: 30 TABLET | Refills: 0 | Status: SHIPPED | OUTPATIENT
Start: 2024-07-01 | End: 2024-07-31

## 2024-07-01 RX ORDER — DEXTROAMPHETAMINE SACCHARATE, AMPHETAMINE ASPARTATE MONOHYDRATE, DEXTROAMPHETAMINE SULFATE AND AMPHETAMINE SULFATE 7.5; 7.5; 7.5; 7.5 MG/1; MG/1; MG/1; MG/1
30 CAPSULE, EXTENDED RELEASE ORAL EVERY MORNING
Qty: 30 CAPSULE | Refills: 0 | Status: SHIPPED | OUTPATIENT
Start: 2024-07-01

## 2024-07-01 NOTE — TELEPHONE ENCOUNTER
LOV 07/01/24  Last refill on 06/02/24, for #30 tabs, with 0 refills  amphetamine-dextroamphetamine (ADDERALL) 30 MG Oral Tab     No future appointments.    Order(s) pending, please review. Thank you.  Magali Jha

## 2024-07-01 NOTE — PROGRESS NOTES
CHIEF COMPLAINT:    Chief Complaint   Patient presents with    Well Adult     Pt is not fasting; ear infection in right ear, Pt would like to discuss with APRN; Snoring       HISTORY OF PRESENT ILLNESS:    Iker presents today, July 01, 2024, for follow-up on right ear infection and to discuss snoring.    Right ear infection, drainage.  Recently swam in the ocean, noticed pain and drainage a few days following.  Has self treated with left over antibiotics.  Currently taking amoxicillin 500mg every 12 hours.  States he has at least a 7 day supply remaining.  Iker is agreeable to continue and will reach out if failure to improve or if worsening.  Denies fever or sinus pressure.    Intermittent snoring.  Witnessed by wife.  Waking up feeling well rested.  Denies nasal congestion or sore throat.  Agreeable to trial flonase for 1 week and set up home sleep study.  Denies chest pain.    ALLERGIES:  No Known Allergies    CURRENT MEDICATIONS:  Current Outpatient Medications   Medication Sig Dispense Refill    amoxicillin 500 MG Oral Cap Take 1 capsule (500 mg total) by mouth Q12H.      ibuprofen 600 MG Oral Tab Take 1 tablet (600 mg total) by mouth every 6 (six) hours as needed for Pain.      fluticasone propionate 50 MCG/ACT Nasal Suspension 2 sprays by Each Nare route daily. 11.1 mL 0    amphetamine-dextroamphetamine ER (ADDERALL XR) 30 MG Oral Capsule SR 24 Hr Take 1 capsule (30 mg total) by mouth daily. 30 capsule 0    amphetamine-dextroamphetamine (ADDERALL) 30 MG Oral Tab Take 1 tablet (30 mg total) by mouth daily. To be taken in the afternoon 30 tablet 0    benzonatate 100 MG Oral Cap Take 1 capsule (100 mg total) by mouth 3 (three) times daily. TAKE WITH FOOD AND WATER (Patient not taking: Reported on 7/1/2024)      amphetamine-dextroamphetamine ER (ADDERALL XR) 30 MG Oral Capsule SR 24 Hr Take 1 capsule (30 mg total) by mouth daily. (Patient not taking: Reported on 7/1/2024) 30 capsule 0     amphetamine-dextroamphetamine ER (ADDERALL XR) 30 MG Oral Capsule SR 24 Hr Take 1 capsule (30 mg total) by mouth daily. (Patient not taking: Reported on 2024) 30 capsule 0       MEDICAL HISTORY:  Past Medical History:    ADHD (attention deficit hyperactivity disorder)    Allergic rhinitis    Eczema    Unspecified otitis media     History reviewed. No pertinent surgical history.  Family History   Problem Relation Age of Onset    Cancer Sister         Thyroid - in remission    Diabetes Paternal Grandmother     Cancer Sister         precancer of cervix, treated    Cancer Other         Grandfather  - Colon     Family Status   Relation Status    Sis Alive    Fa Alive    Mo Alive    MGMA  at age 74        COPD    MGFA         rheumatic fever    PGMA Alive    PGFA  at age 79        colon cancer    Sis Alive    Other (Not Specified)     Social History     Socioeconomic History    Marital status:    Occupational History    Occupation:    Tobacco Use    Smoking status: Some Days     Types: Cigars, Pipe    Smokeless tobacco: Never   Vaping Use    Vaping status: Never Used   Substance and Sexual Activity    Alcohol use: Yes     Comment: 5 -6 per week of beer or whiskey or vodka    Drug use: Yes     Types: Cannabis     Social Determinants of Health      Received from [facility], [facility]    Housing Stability       ROS:  GENERAL:  Denies recorded temperatures greater than 100.5F  RESPIRATORY:  Denies difficulty breathing  CARDIAC:  Denies chest pain with exertion    VITALS:   /72 (BP Location: Right arm, Patient Position: Sitting, Cuff Size: adult)   Pulse 105   Temp 98.4 °F (36.9 °C)   Resp 18   Ht 5' 8\" (1.727 m)   Wt 163 lb 6.4 oz (74.1 kg)   SpO2 100%   BMI 24.84 kg/m²     Reviewed by Yelitza William MS, APRN, FNP-BC    PHYSICAL EXAM:    Constitutional:       Appears well.  Sitting upright on exam table.  Well developed, well  nourished, and in no acute distress  HEENT:      Facial features symmetric. Normocephalic and atraumatic  EAR:      Left ear canal clear.         Right canal with light yellow exudate.      Right TM cloudy with drainage, light reflex not visualized.       Left TM cloudy and intact, neutral in position.   Cardiovascular:      Heart sounds: Regular rate and rhythm without murmur      No edema of BLE  Pulmonary:      Chest expansion symmetric.  Breathing nonlabored. Lungs clear throughout     No cough.  Musculoskeletal:         Movements smooth and controlled with appropriate coordination.       Gait is steady, nonantalgic.  Neuro:       No focal deficits, cranial nerves grossly intact.    Skin:     Warm and dry without jaundice or rashes.  Psychiatric:         Alert and oriented.  Calm and cooperative.  Speech is clear.     ASSESSMENT & PLAN:    1. Non-recurrent acute suppurative otitis media of right ear with spontaneous rupture of tympanic membrane  Continue amoxcillin 500mg BID    2. Snoring  - Home Sleep Apnea Test (Adult pt only) - Sleep consult required for Medicare pts  - General sleep study; Future  - fluticasone propionate 50 MCG/ACT Nasal Suspension; 2 sprays by Each Nare route daily.  Dispense: 11.1 mL; Refill: 0    3. Acute rhinitis  - fluticasone propionate 50 MCG/ACT Nasal Suspension; 2 sprays by Each Nare route daily.  Dispense: 11.1 mL; Refill: 0

## 2024-07-03 DIAGNOSIS — R06.83 SNORING: ICD-10-CM

## 2024-07-03 DIAGNOSIS — J00 ACUTE RHINITIS: ICD-10-CM

## 2024-07-03 RX ORDER — FLUTICASONE PROPIONATE 50 MCG
2 SPRAY, SUSPENSION (ML) NASAL DAILY
Qty: 48 G | Refills: 0 | Status: SHIPPED | OUTPATIENT
Start: 2024-07-03

## 2024-07-03 NOTE — TELEPHONE ENCOUNTER
Patient requesting 90-day supply.       Allergy Medication Protocol [id number]/03/2024 12:40 PM   Protocol Details In person appointment or virtual visit in the past 12 mos or appointment in next 3 mos      Refilled per protocol  fluticasone propionate 50 MCG/ACT Nasal Suspension   Last refilled on 7/1/24 #11.1mL with 0 rf.  LOV- 7/1/24  Last labs- 9/19/23    Sent to pharmacy

## 2024-08-28 ENCOUNTER — OFFICE VISIT (OUTPATIENT)
Facility: LOCATION | Age: 39
End: 2024-08-28
Payer: COMMERCIAL

## 2024-08-28 DIAGNOSIS — F90.2 ATTENTION DEFICIT HYPERACTIVITY DISORDER (ADHD), COMBINED TYPE: ICD-10-CM

## 2024-08-28 DIAGNOSIS — H93.293 ABNORMAL AUDITORY PERCEPTION OF BOTH EARS: Primary | ICD-10-CM

## 2024-08-28 DIAGNOSIS — H72.91 PERFORATION OF RIGHT TYMPANIC MEMBRANE: Primary | ICD-10-CM

## 2024-08-28 PROCEDURE — 92567 TYMPANOMETRY: CPT | Performed by: AUDIOLOGIST

## 2024-08-28 PROCEDURE — 99204 OFFICE O/P NEW MOD 45 MIN: CPT | Performed by: OTOLARYNGOLOGY

## 2024-08-28 PROCEDURE — 92504 EAR MICROSCOPY EXAMINATION: CPT | Performed by: OTOLARYNGOLOGY

## 2024-08-28 PROCEDURE — 92557 COMPREHENSIVE HEARING TEST: CPT | Performed by: AUDIOLOGIST

## 2024-08-28 RX ORDER — DEXTROAMPHETAMINE SACCHARATE, AMPHETAMINE ASPARTATE, DEXTROAMPHETAMINE SULFATE AND AMPHETAMINE SULFATE 7.5; 7.5; 7.5; 7.5 MG/1; MG/1; MG/1; MG/1
30 TABLET ORAL DAILY
Qty: 30 TABLET | Refills: 0 | Status: SHIPPED | OUTPATIENT
Start: 2024-08-28 | End: 2024-09-27

## 2024-08-28 RX ORDER — CIPROFLOXACIN AND DEXAMETHASONE 3; 1 MG/ML; MG/ML
3 SUSPENSION/ DROPS AURICULAR (OTIC) EVERY 12 HOURS
Qty: 7.5 ML | Refills: 1 | Status: SHIPPED | OUTPATIENT
Start: 2024-08-28 | End: 2024-09-04

## 2024-08-28 RX ORDER — DEXTROAMPHETAMINE SACCHARATE, AMPHETAMINE ASPARTATE MONOHYDRATE, DEXTROAMPHETAMINE SULFATE AND AMPHETAMINE SULFATE 7.5; 7.5; 7.5; 7.5 MG/1; MG/1; MG/1; MG/1
30 CAPSULE, EXTENDED RELEASE ORAL EVERY MORNING
Qty: 30 CAPSULE | Refills: 0 | Status: SHIPPED | OUTPATIENT
Start: 2024-08-28

## 2024-08-28 NOTE — PROGRESS NOTES
Iker Mark was seen for audiometric evaluation today.  Referred back to physician.     Thelma Andres

## 2024-08-28 NOTE — TELEPHONE ENCOUNTER
No refill protocol for this medication.    Adderall XR 30mg:  Last refill: 7/31/2024 #30 with 0 refills    Adderall 30mg:  Last refill: 7/31/2024 #30 with 0 refills      Last Visit: 7/01/2024   Next Visit:   No Future Appointments         Forward to Dr. Kelly please advise on refills. Thanks.

## 2024-09-11 ENCOUNTER — OFFICE VISIT (OUTPATIENT)
Facility: LOCATION | Age: 39
End: 2024-09-11
Payer: COMMERCIAL

## 2024-09-11 DIAGNOSIS — H72.91 PERFORATION OF RIGHT TYMPANIC MEMBRANE: Primary | ICD-10-CM

## 2024-09-11 DIAGNOSIS — H93.293 ABNORMAL AUDITORY PERCEPTION OF BOTH EARS: Primary | ICD-10-CM

## 2024-09-11 PROCEDURE — 92557 COMPREHENSIVE HEARING TEST: CPT | Performed by: AUDIOLOGIST

## 2024-09-11 PROCEDURE — 99214 OFFICE O/P EST MOD 30 MIN: CPT | Performed by: OTOLARYNGOLOGY

## 2024-09-11 PROCEDURE — 92567 TYMPANOMETRY: CPT | Performed by: AUDIOLOGIST

## 2024-09-11 NOTE — PROGRESS NOTES
Iker Mark is a 39 year old male. No chief complaint on file.    HPI:   39-year-old white male history of multiple ear tubes last being in 2014 has had frequent and persistent drainage out of the right ear I treated with drops following up.  His hearing is come back to what he thinks is normal.  Left ear is normal.  He has had asymmetric hearing for some time now  Current Outpatient Medications   Medication Sig Dispense Refill    amphetamine-dextroamphetamine ER (ADDERALL XR) 30 MG Oral Capsule SR 24 Hr Take 1 capsule (30 mg total) by mouth every morning. 30 capsule 0    amphetamine-dextroamphetamine (ADDERALL) 30 MG Oral Tab Take 1 tablet (30 mg total) by mouth daily. To be taken in the afternoon 30 tablet 0    FLUTICASONE PROPIONATE 50 MCG/ACT Nasal Suspension SHAKE LIQUID AND USE 2 SPRAYS IN EACH NOSTRIL DAILY 48 g 0    amoxicillin 500 MG Oral Cap Take 1 capsule (500 mg total) by mouth Q12H.      ibuprofen 600 MG Oral Tab Take 1 tablet (600 mg total) by mouth every 6 (six) hours as needed for Pain.      amphetamine-dextroamphetamine ER (ADDERALL XR) 30 MG Oral Capsule SR 24 Hr Take 1 capsule (30 mg total) by mouth every morning. 30 capsule 0      Past Medical History:    ADHD (attention deficit hyperactivity disorder)    Allergic rhinitis    Eczema    Unspecified otitis media      Social History:  Social History     Socioeconomic History    Marital status:    Occupational History    Occupation:    Tobacco Use    Smoking status: Some Days     Types: Cigars, Pipe    Smokeless tobacco: Never   Vaping Use    Vaping status: Never Used   Substance and Sexual Activity    Alcohol use: Yes     Comment: 5 -6 per week of beer or whiskey or vodka    Drug use: Yes     Types: Cannabis     Social Determinants of Health      Received from [facility], [facility]    Housing Stability      History reviewed. No pertinent surgical history.      REVIEW OF SYSTEMS:   GENERAL  HEALTH: feels well otherwise  GENERAL : denies fever, chills, sweats, weight loss, weight gain  SKIN: denies any unusual skin lesions or rashes  RESPIRATORY: denies shortness of breath with exertion  NEURO: denies headaches    EXAM:   There were no vitals taken for this visit.    System Pertinent findings Details   Constitutional  Overall appearance - Normal.   Head/Face  Facial features -- Normal. Skull - Normal.   Eyes  Pupils equal ,round ,react to light and accomidate   Ears  External Ear Right: Normal, Left: Normal. Canal - Right: Normal, Left: Normal. TM - Right: The right tympanic membrane has a anterior inferior perforation fairly small size of the tube.  There is a definite myringostapediopexy and retraction of the eardrum.  There is no discharge noted.  Left: Slight tympanosclerosis left ear   Nose  External Nose, Normal, Septum -midline,Nasal Vault, clear. Turbinates - Right: Normal left: Normal   Mouth/Throat  Lips/teeth/gums - Normal. Tonsils -1+ oropharynx - Normal.   Neck Exam  Inspection - Normal. Palpation - Normal. Parotid gland - Normal. Thyroid gland -normal   Lymph Detail  Submental. Submandibular. Anterior cervical. Posterior cervical. Supraclavicular.   Audiogram shows large volume tympanogram right ear left ear normal normal hearing left ear right ear conductive loss            ASSESSMENT AND PLAN:   1. Perforation of right tympanic membrane  We discussed various treatment options to include tympanoplasty plus reconstruction with ossiculoplasty versus just watching the perforation if there is persistent discharge might consider temporal bone CT scan following up in 6 months time with pre-clinic audiogram      The patient indicates understanding of these issues and agrees to the plan.      Nestor Chadwick MD  9/11/2024  4:17 PM

## 2024-09-23 ENCOUNTER — OFFICE VISIT (OUTPATIENT)
Dept: FAMILY MEDICINE CLINIC | Facility: CLINIC | Age: 39
End: 2024-09-23
Payer: COMMERCIAL

## 2024-09-23 VITALS
WEIGHT: 164 LBS | RESPIRATION RATE: 16 BRPM | SYSTOLIC BLOOD PRESSURE: 138 MMHG | DIASTOLIC BLOOD PRESSURE: 80 MMHG | OXYGEN SATURATION: 100 % | HEART RATE: 109 BPM | TEMPERATURE: 98 F | HEIGHT: 68 IN | BODY MASS INDEX: 24.86 KG/M2

## 2024-09-23 DIAGNOSIS — Z80.8 FAMILY HISTORY OF THYROID CANCER: ICD-10-CM

## 2024-09-23 DIAGNOSIS — Z00.00 ANNUAL PHYSICAL EXAM: Primary | ICD-10-CM

## 2024-09-23 DIAGNOSIS — K21.9 GASTROESOPHAGEAL REFLUX DISEASE, UNSPECIFIED WHETHER ESOPHAGITIS PRESENT: ICD-10-CM

## 2024-09-23 LAB
ALBUMIN SERPL-MCNC: 5.5 G/DL (ref 3.2–4.8)
ALBUMIN/GLOB SERPL: 2 {RATIO} (ref 1–2)
ALP LIVER SERPL-CCNC: 55 U/L
ALT SERPL-CCNC: 35 U/L
ANION GAP SERPL CALC-SCNC: 8 MMOL/L (ref 0–18)
AST SERPL-CCNC: 21 U/L (ref ?–34)
BASOPHILS # BLD AUTO: 0.02 X10(3) UL (ref 0–0.2)
BASOPHILS NFR BLD AUTO: 0.4 %
BILIRUB SERPL-MCNC: 0.5 MG/DL (ref 0.3–1.2)
BUN BLD-MCNC: 11 MG/DL (ref 9–23)
CALCIUM BLD-MCNC: 10.8 MG/DL (ref 8.7–10.4)
CHLORIDE SERPL-SCNC: 103 MMOL/L (ref 98–112)
CHOLEST SERPL-MCNC: 219 MG/DL (ref ?–200)
CO2 SERPL-SCNC: 29 MMOL/L (ref 21–32)
CREAT BLD-MCNC: 1 MG/DL
EGFRCR SERPLBLD CKD-EPI 2021: 99 ML/MIN/1.73M2 (ref 60–?)
EOSINOPHIL # BLD AUTO: 0.01 X10(3) UL (ref 0–0.7)
EOSINOPHIL NFR BLD AUTO: 0.2 %
ERYTHROCYTE [DISTWIDTH] IN BLOOD BY AUTOMATED COUNT: 12.2 %
FASTING PATIENT LIPID ANSWER: NO
FASTING STATUS PATIENT QL REPORTED: NO
GLOBULIN PLAS-MCNC: 2.7 G/DL (ref 2–3.5)
GLUCOSE BLD-MCNC: 109 MG/DL (ref 70–99)
HCT VFR BLD AUTO: 43.9 %
HDLC SERPL-MCNC: 83 MG/DL (ref 40–59)
HGB BLD-MCNC: 15.1 G/DL
IMM GRANULOCYTES # BLD AUTO: 0 X10(3) UL (ref 0–1)
IMM GRANULOCYTES NFR BLD: 0 %
LDLC SERPL CALC-MCNC: 126 MG/DL (ref ?–100)
LIPASE SERPL-CCNC: 31 U/L (ref 12–53)
LYMPHOCYTES # BLD AUTO: 1.19 X10(3) UL (ref 1–4)
LYMPHOCYTES NFR BLD AUTO: 24.9 %
MCH RBC QN AUTO: 31.7 PG (ref 26–34)
MCHC RBC AUTO-ENTMCNC: 34.4 G/DL (ref 31–37)
MCV RBC AUTO: 92.2 FL
MONOCYTES # BLD AUTO: 0.35 X10(3) UL (ref 0.1–1)
MONOCYTES NFR BLD AUTO: 7.3 %
NEUTROPHILS # BLD AUTO: 3.21 X10 (3) UL (ref 1.5–7.7)
NEUTROPHILS # BLD AUTO: 3.21 X10(3) UL (ref 1.5–7.7)
NEUTROPHILS NFR BLD AUTO: 67.2 %
NONHDLC SERPL-MCNC: 136 MG/DL (ref ?–130)
OSMOLALITY SERPL CALC.SUM OF ELEC: 290 MOSM/KG (ref 275–295)
PLATELET # BLD AUTO: 246 10(3)UL (ref 150–450)
POTASSIUM SERPL-SCNC: 4.6 MMOL/L (ref 3.5–5.1)
PROT SERPL-MCNC: 8.2 G/DL (ref 5.7–8.2)
RBC # BLD AUTO: 4.76 X10(6)UL
SODIUM SERPL-SCNC: 140 MMOL/L (ref 136–145)
T4 FREE SERPL-MCNC: 1.3 NG/DL (ref 0.8–1.7)
TRIGL SERPL-MCNC: 56 MG/DL (ref 30–149)
TSI SER-ACNC: 1.51 MIU/ML (ref 0.55–4.78)
VLDLC SERPL CALC-MCNC: 10 MG/DL (ref 0–30)
WBC # BLD AUTO: 4.8 X10(3) UL (ref 4–11)

## 2024-09-23 PROCEDURE — 80061 LIPID PANEL: CPT | Performed by: NURSE PRACTITIONER

## 2024-09-23 PROCEDURE — 84439 ASSAY OF FREE THYROXINE: CPT | Performed by: NURSE PRACTITIONER

## 2024-09-23 PROCEDURE — 83690 ASSAY OF LIPASE: CPT | Performed by: NURSE PRACTITIONER

## 2024-09-23 PROCEDURE — 80050 GENERAL HEALTH PANEL: CPT | Performed by: NURSE PRACTITIONER

## 2024-09-23 NOTE — PROGRESS NOTES
CHIEF COMPLAINT:    Chief Complaint   Patient presents with    Physical       HISTORY OF PRESENT ILLNESS:    Iker Mark is a 39 year old male who has a history of ADHD, fear of flying, GERD, allergic rhinitis, and eczema presents today, September 23, 2024, for an annual physical.    - NUTRITION:  Follows a regular diet.  Drinks approximately 16-32oz of water a day.   - SLEEP:  Sleeps throughout the night.  Snoring on occasion.   - SAFETY:  Reports feeling safe at home.     - SOCIAL/HOBBIES:  , video games with son, cigars occasionally, golfing.   - GOAL:  Iker would like to improve water intake and complete screening tests.  Discussed typical routine workup, Iker would like us to add screening for health of pancreas as well.    Patient Active Problem List   Diagnosis    Allergic rhinitis    Eczema    Attention deficit hyperactivity disorder (ADHD), combined type    GERD (gastroesophageal reflux disease)    Fear of flying        ALLERGIES:  No Known Allergies    CURRENT MEDICATIONS:  Current Outpatient Medications   Medication Sig Dispense Refill    amphetamine-dextroamphetamine ER (ADDERALL XR) 30 MG Oral Capsule SR 24 Hr Take 1 capsule (30 mg total) by mouth every morning. 30 capsule 0    amphetamine-dextroamphetamine (ADDERALL) 30 MG Oral Tab Take 1 tablet (30 mg total) by mouth daily. To be taken in the afternoon 30 tablet 0    FLUTICASONE PROPIONATE 50 MCG/ACT Nasal Suspension SHAKE LIQUID AND USE 2 SPRAYS IN EACH NOSTRIL DAILY 48 g 0    amphetamine-dextroamphetamine ER (ADDERALL XR) 30 MG Oral Capsule SR 24 Hr Take 1 capsule (30 mg total) by mouth every morning. 30 capsule 0       MEDICAL HISTORY:  Past Medical History:    ADHD (attention deficit hyperactivity disorder)    Allergic rhinitis    Anxiety    Eczema    Unspecified otitis media     Past Surgical History:   Procedure Laterality Date    Myringotomy, laser-assisted      Vasectomy       Family History   Problem Relation Age of Onset    Cancer  Sister         Thyroid    Cancer Maternal Grandfather         Colon cancer    Diabetes Paternal Grandmother     Cancer Sister         precancer of cervix, treated    Cancer Other         Grandfather  - Colon     Family Status   Relation Status    Sis Alive    Fa Alive    Mo Alive    MGMA  at age 74        COPD    MGFA         rheumatic fever    PGMA Alive    PGFA  at age 79        colon cancer    Sis Alive    Other (Not Specified)     Social History     Socioeconomic History    Marital status:    Occupational History    Occupation:    Tobacco Use    Smoking status: Some Days     Types: Cigars, Pipe    Smokeless tobacco: Never    Tobacco comments:     Declines cessation education   Vaping Use    Vaping status: Never Used   Substance and Sexual Activity    Alcohol use: Yes     Alcohol/week: 4.0 standard drinks of alcohol     Types: 2 Glasses of wine, 2 Cans of beer per week     Comment: 5 -6 per week of beer or whiskey or vodka    Drug use: Yes     Types: Cannabis   Other Topics Concern    Caffeine Concern No    Exercise No    Seat Belt No    Special Diet No    Stress Concern No    Weight Concern No     Social Determinants of Health      Received from [facility], [facility]    Housing Stability       ROS:    GENERAL:  Denies fever or chills  RESPIRATORY:  Denies difficulty breathing  CARDIAC:  Denies chest pain with exertion  GI:  Denies nausea, vomiting, diarrhea, constipation, or blood in stool  :  Denies blood in urine or painful urination  REPRO:  Denies penile discharge or testicular pain  NEURO:  Denies recent falls   MSKL:  Denies joint stiffness or pain  SKIN:  Denies change in texture of moles   PSYCH: Denies thoughts of self harm or harming others     VITALS:    /80   Pulse 109   Temp 98 °F (36.7 °C) (Temporal)   Resp 16   Ht 5' 8\" (1.727 m)   Wt 164 lb (74.4 kg)   SpO2 100%   BMI 24.94 kg/m²   Ideal body weight:  68.4 kg (150 lb 12.7 oz)  Adjusted ideal body weight: 70.8 kg (156 lb 1.2 oz)  Wt Readings from Last 3 Encounters:   09/23/24 164 lb (74.4 kg)   07/01/24 163 lb 6.4 oz (74.1 kg)   09/19/23 163 lb (73.9 kg)     BP Readings from Last 3 Encounters:   09/23/24 138/80   07/01/24 112/72   09/19/23 132/80     Reviewed by Yelitza William MS, APRN, FNP-BC    PHYSICAL EXAM:    Constitutional:       Appearance: Normal appearance.  Sitting upright on exam table.  Well developed, well nourished, and in no acute distress.  HENT:      Head: Wears glasses. Facial features symmetric. Normocephalic and atraumatic.      Right Ear: Canal clear without erythema or drainage.  TM intact, neutral in position.      Left Ear: Canal clear without erythema or drainage.  TM scarred and intact, neutral in position.      Nose: Nose normal.      Mouth/Throat: Mucous membranes are moist.  Uvula rises midline.  Geographic tongue.  Eyes:      Extraocular Movements: Extraocular movements intact.      Conjunctiva/sclera: Conjunctivae normal. Sclera anicteric         Pupils: Pupils are equal, round, and reactive to light.   Neck:     Neck is supple. Trachea is midline.  No masses.   Cardiovascular:      Heart sounds: Regular rate and rhythm without murmur.     BLE without edema.  Pulmonary:      Effort: Pulmonary effort is normal.      Breath sounds: Lungs clear throughout.     No cough or wheezing.  Abdominal:      General: Abdomen is nondistended, bowel sounds normoactive, soft, nontender.  No organomegaly.  Musculoskeletal:         General: Normal range of motion. Strength of extremities are equal bilaterally.     Range of motion without limitations.  Skin:     General: Skin is warm and dry.      Coloration: Skin is without jaundice     Findings: No bruising or rashes  Neurological:      General: No focal deficit present. Speech is clear and organized.     Mental Status: Alert and oriented to person, place, and time.      Sensory: Sensation is  intact.      Motor: Motor function is intact. Movements are smooth and controlled without ataxia.     Coordination: Coordination is intact. Coordination normal.      Gait: Gait steady and nonantalgic.      Deep Tendon Reflexes: Reflexes 2+ bilaterally.  Psychiatric:         Mood and Affect: Mood normal.         Behavior: Behavior normal.         Thought Content: Thought content normal.         Judgment: Judgment normal.     ASSESSMENT & PLAN:  Plan on follow-up in 1 year or earlier if needed  Refer to result notes for further information    1. Annual physical exam  ADHD well controlled with rx  - Comp Metabolic Panel (14); Future  - CBC With Differential With Platelet; Future  - Lipid Panel; Future  - VENIPUNCTURE  - TSH and Free T4 [E]; Future  - Lipase [E]; Future  - Comp Metabolic Panel (14)  - CBC With Differential With Platelet  - Lipid Panel  - TSH and Free T4 [E]  - Lipase [E]    2. Family history of thyroid cancer  - TSH and Free T4 [E]; Future  - TSH and Free T4 [E]    3. Gastroesophageal reflux disease, unspecified whether esophagitis present  Controlled with diet  - Lipase [E]; Future  - Lipase [E]

## 2024-09-26 DIAGNOSIS — F90.2 ATTENTION DEFICIT HYPERACTIVITY DISORDER (ADHD), COMBINED TYPE: ICD-10-CM

## 2024-09-26 NOTE — TELEPHONE ENCOUNTER
Controlled Substance Medication [id number]/26/2024 11:31 AM    This medication is a controlled substance - forward to provider to refill   Routing to provider per protocol.   amphetamine-dextroamphetamine ER (ADDERALL XR) 30 MG Oral Capsule SR 24 Hr   Last refilled on 8/28/24 for #30  with 0 rf.   Last labs 9/23/24.   Last seen on 9/23/24.     No future appointments.       Thank you.     Routing to provider per protocol.   amphetamine-dextroamphetamine (ADDERALL) 30 MG Oral Tab   Last refilled on 8/28/24 for #30  with 0 rf.     
No

## 2024-09-27 RX ORDER — DEXTROAMPHETAMINE SACCHARATE, AMPHETAMINE ASPARTATE MONOHYDRATE, DEXTROAMPHETAMINE SULFATE AND AMPHETAMINE SULFATE 7.5; 7.5; 7.5; 7.5 MG/1; MG/1; MG/1; MG/1
30 CAPSULE, EXTENDED RELEASE ORAL EVERY MORNING
Qty: 30 CAPSULE | Refills: 0 | Status: SHIPPED | OUTPATIENT
Start: 2024-09-27

## 2024-09-27 RX ORDER — DEXTROAMPHETAMINE SACCHARATE, AMPHETAMINE ASPARTATE, DEXTROAMPHETAMINE SULFATE AND AMPHETAMINE SULFATE 7.5; 7.5; 7.5; 7.5 MG/1; MG/1; MG/1; MG/1
30 TABLET ORAL DAILY
Qty: 30 TABLET | Refills: 0 | Status: SHIPPED | OUTPATIENT
Start: 2024-09-27 | End: 2024-10-27

## 2024-10-25 DIAGNOSIS — F90.2 ATTENTION DEFICIT HYPERACTIVITY DISORDER (ADHD), COMBINED TYPE: ICD-10-CM

## 2024-10-25 RX ORDER — DEXTROAMPHETAMINE SACCHARATE, AMPHETAMINE ASPARTATE, DEXTROAMPHETAMINE SULFATE AND AMPHETAMINE SULFATE 7.5; 7.5; 7.5; 7.5 MG/1; MG/1; MG/1; MG/1
30 TABLET ORAL DAILY
Qty: 30 TABLET | Refills: 0 | Status: SHIPPED | OUTPATIENT
Start: 2024-10-25 | End: 2024-11-24

## 2024-10-25 RX ORDER — DEXTROAMPHETAMINE SACCHARATE, AMPHETAMINE ASPARTATE MONOHYDRATE, DEXTROAMPHETAMINE SULFATE AND AMPHETAMINE SULFATE 7.5; 7.5; 7.5; 7.5 MG/1; MG/1; MG/1; MG/1
30 CAPSULE, EXTENDED RELEASE ORAL EVERY MORNING
Qty: 30 CAPSULE | Refills: 0 | Status: SHIPPED | OUTPATIENT
Start: 2024-10-25

## 2024-11-22 ENCOUNTER — PATIENT MESSAGE (OUTPATIENT)
Dept: FAMILY MEDICINE CLINIC | Facility: CLINIC | Age: 39
End: 2024-11-22

## 2024-11-22 DIAGNOSIS — F90.2 ATTENTION DEFICIT HYPERACTIVITY DISORDER (ADHD), COMBINED TYPE: ICD-10-CM

## 2024-11-22 DIAGNOSIS — R06.83 SNORING: Primary | ICD-10-CM

## 2024-11-22 RX ORDER — DEXTROAMPHETAMINE SACCHARATE, AMPHETAMINE ASPARTATE, DEXTROAMPHETAMINE SULFATE AND AMPHETAMINE SULFATE 7.5; 7.5; 7.5; 7.5 MG/1; MG/1; MG/1; MG/1
30 TABLET ORAL DAILY
Qty: 30 TABLET | Refills: 0 | Status: SHIPPED | OUTPATIENT
Start: 2024-11-22 | End: 2024-12-22

## 2024-11-22 RX ORDER — DEXTROAMPHETAMINE SACCHARATE, AMPHETAMINE ASPARTATE MONOHYDRATE, DEXTROAMPHETAMINE SULFATE AND AMPHETAMINE SULFATE 7.5; 7.5; 7.5; 7.5 MG/1; MG/1; MG/1; MG/1
30 CAPSULE, EXTENDED RELEASE ORAL EVERY MORNING
Qty: 30 CAPSULE | Refills: 0 | Status: SHIPPED | OUTPATIENT
Start: 2024-11-22

## 2024-11-22 NOTE — TELEPHONE ENCOUNTER
Controlled Substance Medication [id number]/22/2024 09:41 AM    This medication is a controlled substance - forward to provider to refill          Controlled Substance Medication [id number]/22/2024 09:41 AM    This medication is a controlled substance - forward to provider to refill          Last refill:   amphetamine-dextroamphetamine ER (ADDERALL XR) 30 MG Oral Capsule SR 24 Hr 30 capsule 0 10/25/2024     Medication Quantity Refills Start End   amphetamine-dextroamphetamine (ADDERALL) 30 MG Oral Tab 30 tablet 0 10/25/2024 11/24/2024     Last Visit: 9/23/24    Next Visit: No future appointments.      Forward to Dr. Yelitza William please advise on refills. Thanks.

## 2024-12-19 DIAGNOSIS — F90.2 ATTENTION DEFICIT HYPERACTIVITY DISORDER (ADHD), COMBINED TYPE: ICD-10-CM

## 2024-12-19 RX ORDER — DEXTROAMPHETAMINE SACCHARATE, AMPHETAMINE ASPARTATE MONOHYDRATE, DEXTROAMPHETAMINE SULFATE AND AMPHETAMINE SULFATE 7.5; 7.5; 7.5; 7.5 MG/1; MG/1; MG/1; MG/1
30 CAPSULE, EXTENDED RELEASE ORAL EVERY MORNING
Qty: 30 CAPSULE | Refills: 0 | Status: SHIPPED | OUTPATIENT
Start: 2025-01-18

## 2024-12-19 RX ORDER — DEXTROAMPHETAMINE SACCHARATE, AMPHETAMINE ASPARTATE, DEXTROAMPHETAMINE SULFATE AND AMPHETAMINE SULFATE 7.5; 7.5; 7.5; 7.5 MG/1; MG/1; MG/1; MG/1
30 TABLET ORAL DAILY
Qty: 30 TABLET | Refills: 0 | Status: SHIPPED | OUTPATIENT
Start: 2025-01-18 | End: 2025-02-17

## 2024-12-19 RX ORDER — DEXTROAMPHETAMINE SACCHARATE, AMPHETAMINE ASPARTATE, DEXTROAMPHETAMINE SULFATE AND AMPHETAMINE SULFATE 7.5; 7.5; 7.5; 7.5 MG/1; MG/1; MG/1; MG/1
30 TABLET ORAL DAILY
Qty: 30 TABLET | Refills: 0 | Status: SHIPPED | OUTPATIENT
Start: 2024-12-19 | End: 2025-01-18

## 2024-12-19 RX ORDER — DEXTROAMPHETAMINE SACCHARATE, AMPHETAMINE ASPARTATE MONOHYDRATE, DEXTROAMPHETAMINE SULFATE AND AMPHETAMINE SULFATE 7.5; 7.5; 7.5; 7.5 MG/1; MG/1; MG/1; MG/1
30 CAPSULE, EXTENDED RELEASE ORAL EVERY MORNING
Qty: 30 CAPSULE | Refills: 0 | Status: SHIPPED | OUTPATIENT
Start: 2024-12-19

## 2024-12-19 NOTE — TELEPHONE ENCOUNTER
Controlled Substance Medication [id number]/19/2024 11:24 AM    This medication is a controlled substance - forward to provider to refill   Routing to provider per protocol.   amphetamine-dextroamphetamine ER (ADDERALL XR) 30 MG Oral Capsule SR 24 Hr   Last refilled on 11/22/24 for #30  with 0 rf.   Last labs 9/23/24.   Last seen on 9/23/24.       Future Appointments   Date Time Provider Department Center   1/2/2025  7:00 PM  SLEEP ROOMS [facility]          Thank you.     Routing to provider per protocol.   amphetamine-dextroamphetamine (ADDERALL) 30 MG Oral Tab   Last refilled on 11/22/24 for #30  with 0 rf.

## 2025-02-17 DIAGNOSIS — F90.2 ATTENTION DEFICIT HYPERACTIVITY DISORDER (ADHD), COMBINED TYPE: ICD-10-CM

## 2025-02-17 RX ORDER — DEXTROAMPHETAMINE SACCHARATE, AMPHETAMINE ASPARTATE MONOHYDRATE, DEXTROAMPHETAMINE SULFATE AND AMPHETAMINE SULFATE 7.5; 7.5; 7.5; 7.5 MG/1; MG/1; MG/1; MG/1
30 CAPSULE, EXTENDED RELEASE ORAL EVERY MORNING
Qty: 30 CAPSULE | Refills: 0 | Status: SHIPPED | OUTPATIENT
Start: 2025-03-19

## 2025-02-17 RX ORDER — DEXTROAMPHETAMINE SACCHARATE, AMPHETAMINE ASPARTATE MONOHYDRATE, DEXTROAMPHETAMINE SULFATE AND AMPHETAMINE SULFATE 7.5; 7.5; 7.5; 7.5 MG/1; MG/1; MG/1; MG/1
30 CAPSULE, EXTENDED RELEASE ORAL EVERY MORNING
Qty: 30 CAPSULE | Refills: 0 | Status: SHIPPED | OUTPATIENT
Start: 2025-02-17

## 2025-02-17 RX ORDER — DEXTROAMPHETAMINE SACCHARATE, AMPHETAMINE ASPARTATE, DEXTROAMPHETAMINE SULFATE AND AMPHETAMINE SULFATE 7.5; 7.5; 7.5; 7.5 MG/1; MG/1; MG/1; MG/1
30 TABLET ORAL DAILY
Qty: 30 TABLET | Refills: 0 | Status: SHIPPED | OUTPATIENT
Start: 2025-03-19

## 2025-02-17 RX ORDER — DEXTROAMPHETAMINE SACCHARATE, AMPHETAMINE ASPARTATE, DEXTROAMPHETAMINE SULFATE AND AMPHETAMINE SULFATE 7.5; 7.5; 7.5; 7.5 MG/1; MG/1; MG/1; MG/1
30 TABLET ORAL DAILY
Qty: 30 TABLET | Refills: 0 | Status: SHIPPED | OUTPATIENT
Start: 2025-02-17 | End: 2025-03-19

## 2025-02-17 NOTE — TELEPHONE ENCOUNTER
No refill protocol for this medication.    Adderall XR 30mg  Last refill: 1/18/2025 #30 with 0 refills    Adderall 30mg:  Last refill: 1/18/2025 #30 with 0 refills      Last Visit: 9/23/2024   Next Visit: No future appointments.      Forward to ROCHELLE Buchanan please advise on refills. Thanks.

## 2025-03-18 ENCOUNTER — OFFICE VISIT (OUTPATIENT)
Dept: SLEEP CENTER | Age: 40
End: 2025-03-18
Attending: INTERNAL MEDICINE
Payer: COMMERCIAL

## 2025-03-18 DIAGNOSIS — R06.83 SNORING: ICD-10-CM

## 2025-03-18 PROCEDURE — 95806 SLEEP STUDY UNATT&RESP EFFT: CPT

## 2025-04-10 ENCOUNTER — APPOINTMENT (OUTPATIENT)
Dept: GASTROENTEROLOGY | Age: 40
End: 2025-04-10

## 2025-04-16 DIAGNOSIS — F90.2 ATTENTION DEFICIT HYPERACTIVITY DISORDER (ADHD), COMBINED TYPE: ICD-10-CM

## 2025-04-16 NOTE — TELEPHONE ENCOUNTER
Controlled Substance Medication [id number]/16/2025 10:05 AM    This medication is a controlled substance - forward to provider to refill    Medication is active on med list   Routing to provider per protocol.   amphetamine-dextroamphetamine (ADDERALL) 30 MG Oral Tab   Last refilled on 3/19/25 for #30  with 0 rf.   Last labs 9/23/24.   Last seen on 9/23/24.     No future appointments.       Thank you.     amphetamine-dextroamphetamine (ADDERALL) 30 MG Oral Tab   Last refilled on 3/19/25 for #30  with 0 rf.

## 2025-04-18 RX ORDER — DEXTROAMPHETAMINE SACCHARATE, AMPHETAMINE ASPARTATE, DEXTROAMPHETAMINE SULFATE AND AMPHETAMINE SULFATE 7.5; 7.5; 7.5; 7.5 MG/1; MG/1; MG/1; MG/1
30 TABLET ORAL DAILY
Qty: 30 TABLET | Refills: 0 | Status: SHIPPED | OUTPATIENT
Start: 2025-04-18

## 2025-04-18 RX ORDER — DEXTROAMPHETAMINE SACCHARATE, AMPHETAMINE ASPARTATE MONOHYDRATE, DEXTROAMPHETAMINE SULFATE AND AMPHETAMINE SULFATE 7.5; 7.5; 7.5; 7.5 MG/1; MG/1; MG/1; MG/1
30 CAPSULE, EXTENDED RELEASE ORAL EVERY MORNING
Qty: 30 CAPSULE | Refills: 0 | Status: SHIPPED | OUTPATIENT
Start: 2025-04-18

## 2025-05-16 DIAGNOSIS — F90.2 ATTENTION DEFICIT HYPERACTIVITY DISORDER (ADHD), COMBINED TYPE: ICD-10-CM

## 2025-05-16 RX ORDER — DEXTROAMPHETAMINE SACCHARATE, AMPHETAMINE ASPARTATE, DEXTROAMPHETAMINE SULFATE AND AMPHETAMINE SULFATE 7.5; 7.5; 7.5; 7.5 MG/1; MG/1; MG/1; MG/1
30 TABLET ORAL DAILY
Qty: 30 TABLET | Refills: 0 | Status: SHIPPED | OUTPATIENT
Start: 2025-05-16

## 2025-05-16 RX ORDER — DEXTROAMPHETAMINE SACCHARATE, AMPHETAMINE ASPARTATE MONOHYDRATE, DEXTROAMPHETAMINE SULFATE AND AMPHETAMINE SULFATE 7.5; 7.5; 7.5; 7.5 MG/1; MG/1; MG/1; MG/1
30 CAPSULE, EXTENDED RELEASE ORAL EVERY MORNING
Qty: 30 CAPSULE | Refills: 0 | Status: SHIPPED | OUTPATIENT
Start: 2025-05-16

## 2025-06-17 DIAGNOSIS — F90.2 ATTENTION DEFICIT HYPERACTIVITY DISORDER (ADHD), COMBINED TYPE: ICD-10-CM

## 2025-06-17 RX ORDER — DEXTROAMPHETAMINE SACCHARATE, AMPHETAMINE ASPARTATE, DEXTROAMPHETAMINE SULFATE AND AMPHETAMINE SULFATE 7.5; 7.5; 7.5; 7.5 MG/1; MG/1; MG/1; MG/1
30 TABLET ORAL DAILY
Qty: 30 TABLET | Refills: 0 | Status: SHIPPED | OUTPATIENT
Start: 2025-07-17

## 2025-06-17 RX ORDER — DEXTROAMPHETAMINE SACCHARATE, AMPHETAMINE ASPARTATE, DEXTROAMPHETAMINE SULFATE AND AMPHETAMINE SULFATE 7.5; 7.5; 7.5; 7.5 MG/1; MG/1; MG/1; MG/1
30 TABLET ORAL DAILY
Qty: 30 TABLET | Refills: 0 | Status: SHIPPED | OUTPATIENT
Start: 2025-06-17

## 2025-06-17 RX ORDER — DEXTROAMPHETAMINE SACCHARATE, AMPHETAMINE ASPARTATE MONOHYDRATE, DEXTROAMPHETAMINE SULFATE AND AMPHETAMINE SULFATE 7.5; 7.5; 7.5; 7.5 MG/1; MG/1; MG/1; MG/1
30 CAPSULE, EXTENDED RELEASE ORAL EVERY MORNING
Qty: 30 CAPSULE | Refills: 0 | Status: SHIPPED | OUTPATIENT
Start: 2025-07-17

## 2025-06-17 RX ORDER — DEXTROAMPHETAMINE SACCHARATE, AMPHETAMINE ASPARTATE MONOHYDRATE, DEXTROAMPHETAMINE SULFATE AND AMPHETAMINE SULFATE 7.5; 7.5; 7.5; 7.5 MG/1; MG/1; MG/1; MG/1
30 CAPSULE, EXTENDED RELEASE ORAL EVERY MORNING
Qty: 30 CAPSULE | Refills: 0 | Status: SHIPPED | OUTPATIENT
Start: 2025-06-17

## 2025-06-17 NOTE — TELEPHONE ENCOUNTER
amphetamine-dextroamphetamine ER (ADDERALL XR) 30 MG Oral Capsule SR 24 Hr   Last refill: 5/16/2025, for #30, 0 refill    amphetamine-dextroamphetamine (ADDERALL) 30 MG Oral Tab   Last refill: 5/16/2025, for #30, 0 refill      Pt failed refill protocol for the following reasons:    Controlled Substance Medication [id number]/17/2025 10:47 AM    This medication is a controlled substance - forward to provider to refill    Medication is active on med list      Last appt: 9/23/2024  Next appt: No future appointments.      Forward to Yelitza BYRD, please advise on refills. Thank you.

## 2025-08-15 DIAGNOSIS — F90.2 ATTENTION DEFICIT HYPERACTIVITY DISORDER (ADHD), COMBINED TYPE: ICD-10-CM

## 2025-08-15 RX ORDER — DEXTROAMPHETAMINE SACCHARATE, AMPHETAMINE ASPARTATE, DEXTROAMPHETAMINE SULFATE AND AMPHETAMINE SULFATE 7.5; 7.5; 7.5; 7.5 MG/1; MG/1; MG/1; MG/1
30 TABLET ORAL DAILY
Qty: 30 TABLET | Refills: 0 | Status: CANCELLED | OUTPATIENT
Start: 2025-08-15

## 2025-08-19 RX ORDER — DEXTROAMPHETAMINE SACCHARATE, AMPHETAMINE ASPARTATE MONOHYDRATE, DEXTROAMPHETAMINE SULFATE AND AMPHETAMINE SULFATE 7.5; 7.5; 7.5; 7.5 MG/1; MG/1; MG/1; MG/1
30 CAPSULE, EXTENDED RELEASE ORAL EVERY MORNING
Qty: 30 CAPSULE | Refills: 0 | OUTPATIENT
Start: 2025-08-19

## 2025-08-20 ENCOUNTER — TELEMEDICINE (OUTPATIENT)
Dept: FAMILY MEDICINE CLINIC | Facility: CLINIC | Age: 40
End: 2025-08-20

## 2025-08-20 DIAGNOSIS — F90.2 ATTENTION DEFICIT HYPERACTIVITY DISORDER (ADHD), COMBINED TYPE: ICD-10-CM

## 2025-08-20 PROCEDURE — 98005 SYNCH AUDIO-VIDEO EST LOW 20: CPT | Performed by: FAMILY MEDICINE

## 2025-08-20 RX ORDER — DEXTROAMPHETAMINE SACCHARATE, AMPHETAMINE ASPARTATE MONOHYDRATE, DEXTROAMPHETAMINE SULFATE AND AMPHETAMINE SULFATE 7.5; 7.5; 7.5; 7.5 MG/1; MG/1; MG/1; MG/1
30 CAPSULE, EXTENDED RELEASE ORAL EVERY MORNING
Qty: 30 CAPSULE | Refills: 0 | Status: SHIPPED | OUTPATIENT
Start: 2025-08-20

## (undated) DIAGNOSIS — F90.2 ATTENTION DEFICIT HYPERACTIVITY DISORDER (ADHD), COMBINED TYPE: ICD-10-CM